# Patient Record
Sex: MALE | Race: WHITE | NOT HISPANIC OR LATINO | Employment: FULL TIME | URBAN - METROPOLITAN AREA
[De-identification: names, ages, dates, MRNs, and addresses within clinical notes are randomized per-mention and may not be internally consistent; named-entity substitution may affect disease eponyms.]

---

## 2018-10-16 ENCOUNTER — OFFICE VISIT (OUTPATIENT)
Dept: ENDOCRINOLOGY | Age: 68
End: 2018-10-16

## 2018-10-16 VITALS
BODY MASS INDEX: 25.06 KG/M2 | HEIGHT: 72 IN | WEIGHT: 185 LBS | SYSTOLIC BLOOD PRESSURE: 142 MMHG | DIASTOLIC BLOOD PRESSURE: 88 MMHG

## 2018-10-16 DIAGNOSIS — B35.1 ONYCHOMYCOSIS: ICD-10-CM

## 2018-10-16 DIAGNOSIS — L84 CORN OF FOOT: ICD-10-CM

## 2018-10-16 DIAGNOSIS — I10 ESSENTIAL HYPERTENSION: ICD-10-CM

## 2018-10-16 DIAGNOSIS — E78.2 MIXED HYPERLIPIDEMIA: ICD-10-CM

## 2018-10-16 DIAGNOSIS — IMO0002 DIABETES MELLITUS TYPE 2, UNCONTROLLED, WITH COMPLICATIONS: Primary | ICD-10-CM

## 2018-10-16 DIAGNOSIS — E67.3 HYPERVITAMINOSIS D: ICD-10-CM

## 2018-10-16 PROCEDURE — 99204 OFFICE O/P NEW MOD 45 MIN: CPT | Performed by: NURSE PRACTITIONER

## 2018-10-16 RX ORDER — EXENATIDE 2 MG/.65ML
2 INJECTION, SUSPENSION, EXTENDED RELEASE SUBCUTANEOUS WEEKLY
Qty: 12 PEN | Refills: 1 | Status: SHIPPED | OUTPATIENT
Start: 2018-10-16 | End: 2019-02-05 | Stop reason: SDUPTHER

## 2018-10-16 RX ORDER — ATORVASTATIN CALCIUM 20 MG/1
20 TABLET, FILM COATED ORAL NIGHTLY
Qty: 90 TABLET | Refills: 1 | Status: SHIPPED | OUTPATIENT
Start: 2018-10-16 | End: 2019-02-05 | Stop reason: SDUPTHER

## 2018-10-16 RX ORDER — DILTIAZEM HYDROCHLORIDE 180 MG/1
180 CAPSULE, COATED, EXTENDED RELEASE ORAL DAILY
COMMUNITY
End: 2020-04-03 | Stop reason: SDUPTHER

## 2018-10-16 RX ORDER — BLOOD-GLUCOSE METER
KIT MISCELLANEOUS
Qty: 300 EACH | Refills: 2 | Status: SHIPPED | OUTPATIENT
Start: 2018-10-16 | End: 2020-04-03 | Stop reason: SDUPTHER

## 2018-10-16 RX ORDER — LOSARTAN POTASSIUM AND HYDROCHLOROTHIAZIDE 12.5; 5 MG/1; MG/1
1 TABLET ORAL DAILY
Qty: 90 TABLET | Refills: 1 | Status: SHIPPED | OUTPATIENT
Start: 2018-10-16 | End: 2019-09-26 | Stop reason: SDUPTHER

## 2018-10-16 RX ORDER — DICLOFENAC SODIUM 75 MG/1
75 TABLET, DELAYED RELEASE ORAL 2 TIMES DAILY
COMMUNITY
Start: 2018-08-15

## 2018-10-16 RX ORDER — EXENATIDE 2 MG/.65ML
INJECTION, SUSPENSION, EXTENDED RELEASE SUBCUTANEOUS
COMMUNITY
Start: 2018-08-03 | End: 2018-10-16 | Stop reason: SDUPTHER

## 2018-10-16 RX ORDER — LOSARTAN POTASSIUM AND HYDROCHLOROTHIAZIDE 12.5; 5 MG/1; MG/1
1 TABLET ORAL DAILY
COMMUNITY
End: 2018-10-16 | Stop reason: SDUPTHER

## 2018-10-16 RX ORDER — ATORVASTATIN CALCIUM 20 MG/1
TABLET, FILM COATED ORAL
COMMUNITY
Start: 2018-08-15 | End: 2018-10-16 | Stop reason: SDUPTHER

## 2018-10-16 RX ORDER — OMEPRAZOLE 20 MG/1
CAPSULE, DELAYED RELEASE ORAL
COMMUNITY
Start: 2018-08-15 | End: 2020-02-13 | Stop reason: SDUPTHER

## 2018-10-16 RX ORDER — LANCETS 28 GAUGE
EACH MISCELLANEOUS
Qty: 300 EACH | Refills: 2 | Status: SHIPPED | OUTPATIENT
Start: 2018-10-16 | End: 2020-04-03 | Stop reason: SDUPTHER

## 2018-10-16 RX ORDER — ICOSAPENT ETHYL 1000 MG/1
2 CAPSULE ORAL 2 TIMES DAILY WITH MEALS
Qty: 360 CAPSULE | Refills: 1 | Status: SHIPPED | OUTPATIENT
Start: 2018-10-16 | End: 2019-01-02 | Stop reason: SDUPTHER

## 2018-10-16 RX ORDER — GABAPENTIN 300 MG/1
CAPSULE ORAL
COMMUNITY
Start: 2018-08-13 | End: 2019-05-21 | Stop reason: SDUPTHER

## 2018-10-16 RX ORDER — TAMSULOSIN HYDROCHLORIDE 0.4 MG/1
CAPSULE ORAL
Refills: 1 | COMMUNITY
Start: 2018-09-28 | End: 2020-05-12

## 2018-10-16 RX ORDER — EMPAGLIFLOZIN 10 MG/1
TABLET, FILM COATED ORAL
COMMUNITY
Start: 2018-08-15 | End: 2018-10-16

## 2018-10-16 RX ORDER — METFORMIN HYDROCHLORIDE 500 MG/1
TABLET, EXTENDED RELEASE ORAL
COMMUNITY
Start: 2018-08-15 | End: 2018-10-16

## 2018-10-16 NOTE — PATIENT INSTRUCTIONS
Stop the jardiance 10mg and metformin    start Synjardy 12.5/1000 mg-1 by mouth twice daily with breakfast or dinner or patient's preference can take 2 capsules together at one time.  With this take vitamin C at least 500 mg and a vitamin D at least at thousand IUs daily increase water at least 4-5 bottles daily especially the first 3-4 weeks while starting this medication    Start the vascepa 1g - 2 cap twice daily      home blood tests -  Blood glucose testing: 3 times daily, that are:  fasting- 1st thing in morning before eating or drinking  before each meal and 1 or 2 hours after meal  bedtime  anytime you feel symptoms of hyperglycemia or hypoglycemia (high or low blood sugars)

## 2018-10-16 NOTE — PROGRESS NOTES
Emmanuel Roach who presents for consult per the request of Provider, No Known  evaluation and treatment of Type 2 diabetes mellitus insulin dependent.      The initial diagnosis of diabetes was made 10 years ago. The condition of diabetes location is system with the course being clinical course has fluctuated , the severity is Moderate , and the modifying/allievating factors are  oral medications. Insulin dosage review with Emmanuel Roach suggested compliance most of the time   .       The patient reports associated symptoms of hyperglycemia have been none and associated symptoms of hypoglycemia have been nausea, with their hypoglycemia threshold for symptoms is 80 mg/dl .       The patient is currently taking home blood tests - Blood glucose testin times daily, that are:  fasting- 1st thing in morning before eating or drinking     Compliance with blood glucose monitoring: good.     Exercise:intermittently The patient is using no plan.    Home blood glucose testing daily: 1  FB to 170 mg/dl    Patterns reported per patient are none.      The following portions of the patient's history were reviewed and updated as appropriate: current medications, past family history, past medical history, past social history, past surgical history and problem list.        Medical records, chart, and labs reviewed from primary care provider.    Past Surgical History:   Procedure Laterality Date   • ELBOW ARTHROPLASTY     • HERNIA REPAIR     • PROSTATE SURGERY         Family History   Problem Relation Age of Onset   • Diabetes Mother          Current Outpatient Prescriptions:   •  atorvastatin (LIPITOR) 20 MG tablet, Take 1 tablet by mouth Every Night., Disp: 90 tablet, Rfl: 1  •  BYDUREON 2 MG pen-injector injection, Inject 1 pen under the skin into the appropriate area as directed 1 (One) Time Per Week., Disp: 12 pen, Rfl: 1  •  diclofenac (VOLTAREN) 75 MG EC tablet, , Disp: , Rfl:   •  diltiaZEM CD (DILTIAZEM CD) 180 MG  "24 hr capsule, Take 180 mg by mouth Daily., Disp: , Rfl:   •  gabapentin (NEURONTIN) 300 MG capsule, , Disp: , Rfl:   •  losartan-hydrochlorothiazide (HYZAAR) 50-12.5 MG per tablet, Take 1 tablet by mouth Daily., Disp: 90 tablet, Rfl: 1  •  omeprazole (priLOSEC) 20 MG capsule, , Disp: , Rfl:   •  tamsulosin (FLOMAX) 0.4 MG capsule 24 hr capsule, TK ONE C PO  BID, Disp: , Rfl: 1  •  Empagliflozin-Metformin HCl 12.5-1000 MG tablet, Take 12.5 mg by mouth 2 (Two) Times a Day., Disp: 180 tablet, Rfl: 1  •  FREESTYLE LITE test strip, Test 3 times daily, Disp: 300 each, Rfl: 2  •  icosapent ethyl (VASCEPA) 1 g capsule capsule, Take 2 g by mouth 2 (Two) Times a Day With Meals., Disp: 360 capsule, Rfl: 1  •  Lancets (FREESTYLE) lancets, Test 3 times daily, Disp: 300 each, Rfl: 2    No Known Allergies    Patient Active Problem List    Diagnosis   • Diabetes mellitus type 2, uncontrolled, with complications (CMS/HCC) [E11.8, E11.65]   • Mixed hyperlipidemia [E78.2]   • Essential hypertension [I10]   • Onychomycosis [B35.1]   • Corn of foot [L84]   • Hypervitaminosis D  [E67.3]       Review of Systems  A comprehensive review of the 14 systems was negative except of listed below:  Genitourinary:positive for frequency and nocturia  Neurological: positive for numbness intermittantly with anthony. LE and UEs, painful lesion on plantar surface of foot described as \"corn\"  Behavioral/Psych: positive for sleep disturbance  Endocrine: hyperglycemia      Objective:     /88   Ht 182.9 cm (72\")   Wt 83.9 kg (185 lb)   BMI 25.09 kg/m²     General appearance:  alert, cooperative,orientated, , fatigued, nourished\" \"appears stated age and cooperative\" \"NAD   Neck: no carotid bruit, supple, symmetrical, trachea midline and thyroid not enlarged, symmetric, no tenderness/mass/nodules   Thyroid:  no palpable nodule, no tenderness, no enlargement,    Lung:  \"clear to auscultation bilaterally\" \"no abnormal breath sounds  \" effort was normal, " "no labored breath, no use of accessory muscles.   Heart: regular rate and rhythm, S1, S2 normal, no murmur, click, rub or gallop      Abdomen:  normal bowel sounds- 4 quads, soft non-tender and contour - slt rounded      Extremities: extremities normal, atraumatic, no cyanosis or edema\" - gait and station, strength and stability\" right plantar surface lesion appr 2cm by 1cm smooth - has been shaved, tender to palpation. Samson feet toenail plates- irregular discolored with yellow gray, ridges and thickened         Skin:    Pulses:  warm and dry, no hyperpigmentation, normal skin coloring, or suspicious lesions  2+ and symmetric   Neuro: Alert and oriented x3. Gait normal. Reflexes and motor strength normal and symmetric. Cranial nerves 2-12 and sensation grossly intact.      Psych: behavior - normal, judgement - normal, mood - normal, affect - normal                   Diabetic foot exam:   Left: Filament test present   Pulses Dorsalis Pedis:  present  Posterior Tibial:  present   Reflexes 3+    Vibratory sensation normal   Proprioception normal   Sharp/dull discrimination normal       Right: Filament test present   Pulses Dorsalis Pedis:  present  Posterior Tibial:  present   Reflexes 3+    Vibratory sensation normal   Proprioception normal   Sharp/dull discrimination normal      Lab Review  PCP labs dated 8.7.2018      Assessment:   Emmanuel was seen today for diabetes.    Diagnoses and all orders for this visit:    Diabetes mellitus type 2, uncontrolled, with complications (CMS/[facility])  -     Fructosamine  -     Comprehensive Metabolic Panel  -     C-Peptide  -     Hemoglobin A1c  -     Insulin, Total  -     Microalbumin / Creatinine Urine Ratio - Urine, Clean Catch  -     Uric Acid  -     Vitamin D 25 Hydroxy  -     TSH  -     Thyroid Antibodies  -     T4, Free  -     T4  -     T3, Free  -     T3  -     BYDUREON 2 MG pen-injector injection; Inject 1 pen under the skin into the appropriate area as directed 1 (One) Time " Per Week.  -     Empagliflozin-Metformin HCl 12.5-1000 MG tablet; Take 12.5 mg by mouth 2 (Two) Times a Day.  -     Ambulatory Referral to Podiatry; Future  -     Comprehensive Metabolic Panel; Future  -     C-Peptide; Future  -     Hemoglobin A1c; Future  -     Insulin, Total; Future  -     Lipid Panel; Future  -     Microalbumin / Creatinine Urine Ratio - Urine, Clean Catch; Future  -     Uric Acid; Future  -     Vitamin D 25 Hydroxy; Future  -     TSH; Future  -     T3; Future  -     T4; Future    Mixed hyperlipidemia  -     Comprehensive Metabolic Panel  -     atorvastatin (LIPITOR) 20 MG tablet; Take 1 tablet by mouth Every Night.  -     icosapent ethyl (VASCEPA) 1 g capsule capsule; Take 2 g by mouth 2 (Two) Times a Day With Meals.  -     Comprehensive Metabolic Panel; Future    Essential hypertension  -     Comprehensive Metabolic Panel  -     losartan-hydrochlorothiazide (HYZAAR) 50-12.5 MG per tablet; Take 1 tablet by mouth Daily.  -     Comprehensive Metabolic Panel; Future    Hypervitaminosis D   -     Vitamin D 25 Hydroxy  -     Comprehensive Metabolic Panel; Future  -     Vitamin D 25 Hydroxy; Future    Dunning of foot  -     Ambulatory Referral to Podiatry; Future  -     Comprehensive Metabolic Panel; Future    Onychomycosis  -     Ambulatory Referral to Podiatry; Future         Plan:    Summary/Medication changes:     Emmanuel Roach who presents for consult per the request of his PCP and self to be  Evaluated  and for the treatment of Type 2 diabetes mellitus insulin dependent.  This is a new problem to me. The initial diagnosis of diabetes was made 10 years ago, as per patient account it was diagnosed with general blood work laboratory testing denied any symptoms at that time..  The modifying/allievating factors are  oral medications.  The medications were reviewed with Emmanuel Roach suggested compliance most of the time   .  The patient reports checking blood glucose one time daily with fasting  averaging between 140 through 170 mg/Sam.  The patient reports associated symptoms of hyperglycemia have been none and associated symptoms of hypoglycemia have been nausea, with their hypoglycemia threshold for symptoms is 80 mg/dl . The  patient's history were reviewed and updated as appropriate: current medications, past family history, past medical history, past social history, past surgical history and problem list.    Medical records, chart, and labs reviewed from primary care provider.  Patient states that his last eye exam was 1.5 years ago with his ophthalmology and there was no difficulty problems at that time.  This provider did instruct him that that is a yearly exam with his diabetes.  Last foot exam was unknown he has not seen a podiatrist.  Was performed today for this provider-no symptoms or signs of neuropathy does have a callus versus plantar wart on right plantar surface of the foot will refer to podiatry.  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  Dental examinations are up-to-date.  Evaluated labs from August 7, 2018 at this time additional lab work will be obtained and treat as indicated with the results.  Today's medication changes were as follows:    1.  Type 2 diabetes uncontrolled    Stop the jardiance 10mg and metformin    start Synjardy 12.5/1000 mg-1 by mouth twice daily with breakfast or dinner or patient's preference can take 2 capsules together at one time.  With this take vitamin C at least 500 mg vitamin D at least at thousand IUs daily increase water at least 4-5 bottles daily especially the first 3-4 weeks while starting this medication    2.  Hyperlipidemia-hypertriglycerides uncontrolled  Start the vascepa 1g - 2 cap twice daily    Additional instructions given:  home blood tests -  Blood glucose  testing: 3 times daily, that are:  fasting- 1st thing in morning before eating or drinking  before each meal and 1 or 2 hours after meal  bedtime  anytime you feel symptoms of hyperglycemia or hypoglycemia (high or low blood sugars)      Education:  interpretation of lab results, blood sugar goals, complications of diabetes mellitus, hypoglycemia prevention and treatment, exercise, illness management, self-monitoring of blood glucose skills, nutrition, carbohydrate counting, site rotation and use of insulin pen        Return in about 3 months (around 1/16/2019), or if symptoms worsen or fail to improve, for Recheck. Follow-up in 3 months with Josephine-2 weeks prior for labs 6 months with Dr. Ojeda-2 weeks prior for labs        Dragon transcription disclaimer     Much of this encounter note is an electronic transcription/translation of spoken language to printed text. The electronic translation of spoken language may permit erroneous, or at times, nonsensical words or phrases to be inadvertently transcribed. Although I have reviewed the note for such errors, some may still exist.

## 2018-10-17 LAB
25(OH)D3+25(OH)D2 SERPL-MCNC: 16.6 NG/ML (ref 30–100)
ALBUMIN SERPL-MCNC: 4.5 G/DL (ref 3.5–5.2)
ALBUMIN/CREAT UR: 9.2 MG/G CREAT (ref 0–30)
ALBUMIN/GLOB SERPL: 1.5 G/DL
ALP SERPL-CCNC: 100 U/L (ref 39–117)
ALT SERPL-CCNC: 32 U/L (ref 1–41)
AST SERPL-CCNC: 20 U/L (ref 1–40)
BILIRUB SERPL-MCNC: 0.4 MG/DL (ref 0.1–1.2)
BUN SERPL-MCNC: 25 MG/DL (ref 8–23)
BUN/CREAT SERPL: 22.1 (ref 7–25)
C PEPTIDE SERPL-MCNC: 3.8 NG/ML (ref 1.1–4.4)
CALCIUM SERPL-MCNC: 9.5 MG/DL (ref 8.6–10.5)
CHLORIDE SERPL-SCNC: 104 MMOL/L (ref 98–107)
CO2 SERPL-SCNC: 23.4 MMOL/L (ref 22–29)
CREAT SERPL-MCNC: 1.13 MG/DL (ref 0.76–1.27)
CREAT UR-MCNC: 89.2 MG/DL
FRUCTOSAMINE SERPL-SCNC: 274 UMOL/L (ref 0–285)
GLOBULIN SER CALC-MCNC: 3 GM/DL
GLUCOSE SERPL-MCNC: 152 MG/DL (ref 65–99)
HBA1C MFR BLD: 6.57 % (ref 4.8–5.6)
INSULIN SERPL-ACNC: 10.4 UIU/ML (ref 2.6–24.9)
MICROALBUMIN UR-MCNC: 8.2 UG/ML
POTASSIUM SERPL-SCNC: 4.1 MMOL/L (ref 3.5–5.2)
PROT SERPL-MCNC: 7.5 G/DL (ref 6–8.5)
SODIUM SERPL-SCNC: 143 MMOL/L (ref 136–145)
T3 SERPL-MCNC: 95 NG/DL (ref 80–200)
T3FREE SERPL-MCNC: 4.9 PG/ML (ref 2–4.4)
T4 FREE SERPL-MCNC: 1.35 NG/DL (ref 0.93–1.7)
T4 SERPL-MCNC: 6.29 MCG/DL (ref 4.5–11.7)
THYROGLOB AB SERPL-ACNC: <1 IU/ML (ref 0–0.9)
THYROPEROXIDASE AB SERPL-ACNC: 9 IU/ML (ref 0–34)
TSH SERPL DL<=0.005 MIU/L-ACNC: 1.32 MIU/ML (ref 0.27–4.2)
URATE SERPL-MCNC: 4.1 MG/DL (ref 3.4–7)

## 2018-10-18 DIAGNOSIS — E55.9 VITAMIN D DEFICIENCY: Primary | ICD-10-CM

## 2018-10-18 RX ORDER — ERGOCALCIFEROL 1.25 MG/1
50000 CAPSULE ORAL DAILY
Qty: 7 CAPSULE | Refills: 0 | Status: SHIPPED | OUTPATIENT
Start: 2018-10-18 | End: 2019-02-05 | Stop reason: SDUPTHER

## 2018-11-19 DIAGNOSIS — IMO0002 DIABETES MELLITUS TYPE 2, UNCONTROLLED, WITH COMPLICATIONS: Primary | ICD-10-CM

## 2018-11-19 RX ORDER — METFORMIN HYDROCHLORIDE 500 MG/1
1000 TABLET, EXTENDED RELEASE ORAL 2 TIMES DAILY
Qty: 120 TABLET | Refills: 11 | Status: SHIPPED | OUTPATIENT
Start: 2018-11-19 | End: 2018-11-21 | Stop reason: SDUPTHER

## 2018-11-20 ENCOUNTER — TELEPHONE (OUTPATIENT)
Dept: ENDOCRINOLOGY | Age: 68
End: 2018-11-20

## 2018-11-20 NOTE — TELEPHONE ENCOUNTER
----- Message from EDVIN Swan sent at 11/19/2018  2:52 PM EST -----  Contact: patient   jardiance and metformin was sent in   ----- Message -----  From: Shanell Villalba MA  Sent: 11/16/2018   3:54 PM  To: EDVIN Swan    Please advise.     ----- Message -----  From: Josephine Hills  Sent: 11/16/2018   3:36 PM  To: Shanell Villalba MA    Medication:  Empagliflozin-Metformin HCl 12.5-1000 MG tablet    Message: patient has called in regards to getting the above medication re called in as individual  prescription. Patient is stating , the [facility] pharmacy dose not carry this medication and will have to get office to re send the medication as individual prescription. patient is requesting the following medication to be sent in   JARDIANCE 10 MG tablet and metFORMIN ER (GLUCOPHAGE-XR) 500 MG 24 hr tablet    Please sent them to the following pharmacy   Pharmacy:  [facility] Phone:  [phone] Fax:  [phone]   Address:  [address] [place] [address]           Patient is requesting a call back in regards to this issue    [phone]

## 2018-11-21 DIAGNOSIS — IMO0002 DIABETES MELLITUS TYPE 2, UNCONTROLLED, WITH COMPLICATIONS: ICD-10-CM

## 2018-11-21 RX ORDER — METFORMIN HYDROCHLORIDE 500 MG/1
1000 TABLET, EXTENDED RELEASE ORAL 2 TIMES DAILY
Qty: 360 TABLET | Refills: 0 | Status: SHIPPED | OUTPATIENT
Start: 2018-11-21 | End: 2018-11-30 | Stop reason: SDUPTHER

## 2018-11-21 RX ORDER — METFORMIN HYDROCHLORIDE 500 MG/1
1000 TABLET, EXTENDED RELEASE ORAL 2 TIMES DAILY
Qty: 360 TABLET | Refills: 0 | Status: SHIPPED | OUTPATIENT
Start: 2018-11-21 | End: 2018-11-21 | Stop reason: SDUPTHER

## 2018-11-21 NOTE — TELEPHONE ENCOUNTER
PATIENT NEEDED MEDICATION SENT TO  [facility]  PHARMACY  IN [facility]

## 2018-11-30 DIAGNOSIS — IMO0002 DIABETES MELLITUS TYPE 2, UNCONTROLLED, WITH COMPLICATIONS: ICD-10-CM

## 2018-11-30 RX ORDER — METFORMIN HYDROCHLORIDE 500 MG/1
1000 TABLET, EXTENDED RELEASE ORAL 2 TIMES DAILY
Qty: 360 TABLET | Refills: 0 | Status: SHIPPED | OUTPATIENT
Start: 2018-11-30 | End: 2019-02-05

## 2019-01-02 DIAGNOSIS — E78.2 MIXED HYPERLIPIDEMIA: ICD-10-CM

## 2019-01-02 RX ORDER — ICOSAPENT ETHYL 1000 MG/1
2 CAPSULE ORAL 2 TIMES DAILY WITH MEALS
Qty: 360 CAPSULE | Refills: 0 | Status: SHIPPED | OUTPATIENT
Start: 2019-01-02 | End: 2019-02-05 | Stop reason: SDUPTHER

## 2019-01-16 ENCOUNTER — RESULTS ENCOUNTER (OUTPATIENT)
Dept: ENDOCRINOLOGY | Age: 69
End: 2019-01-16

## 2019-01-16 DIAGNOSIS — IMO0002 DIABETES MELLITUS TYPE 2, UNCONTROLLED, WITH COMPLICATIONS: ICD-10-CM

## 2019-01-16 DIAGNOSIS — L84 CORN OF FOOT: ICD-10-CM

## 2019-01-16 DIAGNOSIS — E78.2 MIXED HYPERLIPIDEMIA: ICD-10-CM

## 2019-01-16 DIAGNOSIS — I10 ESSENTIAL HYPERTENSION: ICD-10-CM

## 2019-01-16 DIAGNOSIS — E67.3 HYPERVITAMINOSIS D: ICD-10-CM

## 2019-01-23 LAB
25(OH)D3+25(OH)D2 SERPL-MCNC: 27.2 NG/ML (ref 30–100)
ALBUMIN SERPL-MCNC: 4.6 G/DL (ref 3.5–5.2)
ALBUMIN/CREAT UR: <4.8 MG/G CREAT (ref 0–30)
ALBUMIN/GLOB SERPL: 1.6 G/DL
ALP SERPL-CCNC: 78 U/L (ref 39–117)
ALT SERPL-CCNC: 34 U/L (ref 1–41)
AST SERPL-CCNC: 20 U/L (ref 1–40)
BILIRUB SERPL-MCNC: 0.6 MG/DL (ref 0.1–1.2)
BUN SERPL-MCNC: 25 MG/DL (ref 8–23)
BUN/CREAT SERPL: 23.1 (ref 7–25)
C PEPTIDE SERPL-MCNC: 5 NG/ML (ref 1.1–4.4)
CALCIUM SERPL-MCNC: 9.8 MG/DL (ref 8.6–10.5)
CHLORIDE SERPL-SCNC: 102 MMOL/L (ref 98–107)
CHOLEST SERPL-MCNC: 134 MG/DL (ref 0–200)
CO2 SERPL-SCNC: 26 MMOL/L (ref 22–29)
CREAT SERPL-MCNC: 1.08 MG/DL (ref 0.76–1.27)
CREAT UR-MCNC: 62.3 MG/DL
GLOBULIN SER CALC-MCNC: 2.8 GM/DL
GLUCOSE SERPL-MCNC: 143 MG/DL (ref 65–99)
HBA1C MFR BLD: 8.22 % (ref 4.8–5.6)
HDLC SERPL-MCNC: 51 MG/DL (ref 40–60)
INSULIN SERPL-ACNC: 11.3 UIU/ML (ref 2.6–24.9)
INTERPRETATION: NORMAL
LDLC SERPL CALC-MCNC: 69 MG/DL (ref 0–100)
Lab: NORMAL
MICROALBUMIN UR-MCNC: <3 UG/ML
POTASSIUM SERPL-SCNC: 4.8 MMOL/L (ref 3.5–5.2)
PROT SERPL-MCNC: 7.4 G/DL (ref 6–8.5)
SODIUM SERPL-SCNC: 141 MMOL/L (ref 136–145)
T3 SERPL-MCNC: 108.5 NG/DL (ref 80–200)
T4 SERPL-MCNC: 6.74 MCG/DL (ref 4.5–11.7)
TRIGL SERPL-MCNC: 71 MG/DL (ref 0–150)
TSH SERPL DL<=0.005 MIU/L-ACNC: 1.77 MIU/ML (ref 0.27–4.2)
URATE SERPL-MCNC: 4.5 MG/DL (ref 3.4–7)
VLDLC SERPL CALC-MCNC: 14.2 MG/DL (ref 5–40)

## 2019-02-05 ENCOUNTER — OFFICE VISIT (OUTPATIENT)
Dept: ENDOCRINOLOGY | Age: 69
End: 2019-02-05

## 2019-02-05 VITALS
WEIGHT: 192 LBS | HEIGHT: 72 IN | BODY MASS INDEX: 26.01 KG/M2 | DIASTOLIC BLOOD PRESSURE: 86 MMHG | SYSTOLIC BLOOD PRESSURE: 138 MMHG

## 2019-02-05 DIAGNOSIS — E55.9 VITAMIN D DEFICIENCY: ICD-10-CM

## 2019-02-05 DIAGNOSIS — E78.2 MIXED HYPERLIPIDEMIA: ICD-10-CM

## 2019-02-05 DIAGNOSIS — I10 ESSENTIAL HYPERTENSION: ICD-10-CM

## 2019-02-05 DIAGNOSIS — IMO0002 DIABETES MELLITUS TYPE 2, UNCONTROLLED, WITH COMPLICATIONS: Primary | ICD-10-CM

## 2019-02-05 PROCEDURE — 99214 OFFICE O/P EST MOD 30 MIN: CPT | Performed by: NURSE PRACTITIONER

## 2019-02-05 RX ORDER — EXENATIDE 2 MG/.65ML
2 INJECTION, SUSPENSION, EXTENDED RELEASE SUBCUTANEOUS WEEKLY
Qty: 12 PEN | Refills: 1 | Status: SHIPPED | OUTPATIENT
Start: 2019-02-05 | End: 2019-04-11 | Stop reason: SDUPTHER

## 2019-02-05 RX ORDER — CHOLECALCIFEROL (VITAMIN D3) 125 MCG
15 CAPSULE ORAL NIGHTLY
COMMUNITY
End: 2019-02-05

## 2019-02-05 RX ORDER — ATORVASTATIN CALCIUM 20 MG/1
20 TABLET, FILM COATED ORAL NIGHTLY
Qty: 90 TABLET | Refills: 1 | Status: SHIPPED | OUTPATIENT
Start: 2019-02-05 | End: 2019-05-10 | Stop reason: SDUPTHER

## 2019-02-05 RX ORDER — ERGOCALCIFEROL 1.25 MG/1
50000 CAPSULE ORAL DAILY
Qty: 7 CAPSULE | Refills: 0 | Status: SHIPPED | OUTPATIENT
Start: 2019-02-05 | End: 2019-02-07 | Stop reason: SDUPTHER

## 2019-02-05 RX ORDER — ICOSAPENT ETHYL 1000 MG/1
2 CAPSULE ORAL 2 TIMES DAILY WITH MEALS
Qty: 360 CAPSULE | Refills: 1 | Status: SHIPPED | OUTPATIENT
Start: 2019-02-05 | End: 2019-02-08 | Stop reason: SDUPTHER

## 2019-02-05 NOTE — PROGRESS NOTES
Emmanuel Roach  presents to the office  for the follow-up appointment for Type 2 diabetes mellitus.     The diabete's condition location is throughout the system with the  clinical course has fluctuated, the severity is Mild, and the modifying/allievating factors are oral medications.  Medications and  Dosages were  reviewed with Emmanuel Roach and suggested that compliance most of the time.    The patient reports associated symptoms of hyperglycemia have been none and associated symptoms of hypoglycemia have been nausea, with their  hypoglycemia threshold for symptoms is 80 mg/dl .     The patient is currently on oral medications .      Compliance with blood glucose monitoring: fair.     Meal panning: The patient is using avoidance of concentrated sweets.    The patient is currently taking home blood tests - Blood glucose testin times daily, that are:  fasting- 1st thing in morning before eating or drinking    Last instructions given were:  Stop the jardiance 10mg and metformin     start Synjardy 12.1000 mg-1 by mouth twice daily with breakfast or dinner or patient's preference can take 2 capsules together at one time.  With this take vitamin C at least 500 mg and a vitamin D at least at thousand IUs daily increase water at least 4-5 bottles daily especially the first 3-4 weeks while starting this medication     Start the vascepa 1g - 2 cap twice daily    Home blood glucose testing daily: 1  FB to 170 mg/dl  Before evening meal - 120-160 mg/dl       Last reported blood glucose readings are:  Home blood glucose testing daily: 1  FB to 170 mg/dl    Patterns reported per patient are none.         The following portions of the patient's history were reviewed and updated as appropriate: current medications, past family history, past medical history, past social history, past surgical history and problem list.      Current Outpatient Medications:   •  atorvastatin (LIPITOR) 20 MG tablet, Take 1  tablet by mouth Every Night., Disp: 90 tablet, Rfl: 1  •  BYDUREON 2 MG pen-injector injection, Inject 1 pen under the skin into the appropriate area as directed 1 (One) Time Per Week., Disp: 12 pen, Rfl: 1  •  Cholecalciferol (VITAMIN D-3) 5000 units tablet, Take 5,000 Units by mouth Daily. To start after the 50,000 daily for 1 week, Disp: 30 tablet, Rfl: 4  •  diclofenac (VOLTAREN) 75 MG EC tablet, , Disp: , Rfl:   •  diltiaZEM CD (DILTIAZEM CD) 180 MG 24 hr capsule, Take 180 mg by mouth Daily., Disp: , Rfl:   •  FREESTYLE LITE test strip, Test 3 times daily, Disp: 300 each, Rfl: 2  •  gabapentin (NEURONTIN) 300 MG capsule, , Disp: , Rfl:   •  icosapent ethyl (VASCEPA) 1 g capsule capsule, Take 2 g by mouth 2 (Two) Times a Day With Meals., Disp: 360 capsule, Rfl: 1  •  Lancets (FREESTYLE) lancets, Test 3 times daily, Disp: 300 each, Rfl: 2  •  losartan-hydrochlorothiazide (HYZAAR) 50-12.5 MG per tablet, Take 1 tablet by mouth Daily., Disp: 90 tablet, Rfl: 1  •  omeprazole (priLOSEC) 20 MG capsule, , Disp: , Rfl:   •  tamsulosin (FLOMAX) 0.4 MG capsule 24 hr capsule, TK ONE C PO  BID, Disp: , Rfl: 1  •  vitamin D (ERGOCALCIFEROL) 57432 units capsule capsule, Take 1 capsule by mouth Daily., Disp: 7 capsule, Rfl: 0  •  cholecalciferol (VITAMIN D3) 80198 units capsule, Take 1 capsule by mouth Daily., Disp: 90 capsule, Rfl: 1  •  dapagliflozin-metformin HCl ER (XIGDUO XR) 5-1000 MG tablet, Take 1 tablet by mouth 2 (Two) Times a Day., Disp: 60 tablet, Rfl: 2    Patient Active Problem List    Diagnosis   • Vitamin D deficiency [E55.9]   • Diabetes mellitus type 2, uncontrolled, with complications (CMS/HCC) [E11.8, E11.65]   • Mixed hyperlipidemia [E78.2]   • Essential hypertension [I10]   • Onychomycosis [B35.1]   • Corn of foot [L84]   • Hypervitaminosis D  [E67.3]       Review of Systems   A comprehensive review of systems was negative.- 14 systems reviewed.      Objective:     Wt Readings from Last 3 Encounters:  "  02/05/19 87.1 kg (192 lb)   10/16/18 83.9 kg (185 lb)     Temp Readings from Last 3 Encounters:   No data found for Temp     BP Readings from Last 3 Encounters:   02/05/19 138/86   10/16/18 142/88     Pulse Readings from Last 3 Encounters:   No data found for Pulse        /86   Ht 182.9 cm (72.01\")   Wt 87.1 kg (192 lb)   BMI 26.03 kg/m²        Physical Exam   Constitutional: He is oriented to person, place, and time. He appears well-developed and well-nourished. No distress.   HENT:   Head: Normocephalic and atraumatic.   Eyes: EOM are normal. Pupils are equal, round, and reactive to light.   Neck: Normal range of motion. Neck supple. No thyromegaly present.   Cardiovascular: Normal rate, regular rhythm, normal heart sounds and intact distal pulses.   No murmur heard.  Pulmonary/Chest: Effort normal and breath sounds normal.   Abdominal: Soft. Bowel sounds are normal.   Musculoskeletal: Normal range of motion.   Neurological: He is alert and oriented to person, place, and time.   Skin: Skin is warm and dry. Capillary refill takes 2 to 3 seconds. He is not diaphoretic.   Psychiatric: He has a normal mood and affect. His behavior is normal. Judgment and thought content normal.           Lab Review  Results for orders placed or performed in visit on 01/16/19   Comprehensive Metabolic Panel   Result Value Ref Range    Glucose 143 (H) 65 - 99 mg/dL    BUN 25 (H) 8 - 23 mg/dL    Creatinine 1.08 0.76 - 1.27 mg/dL    eGFR Non African Am 68 >60 mL/min/1.73    eGFR African Am 82 >60 mL/min/1.73    BUN/Creatinine Ratio 23.1 7.0 - 25.0    Sodium 141 136 - 145 mmol/L    Potassium 4.8 3.5 - 5.2 mmol/L    Chloride 102 98 - 107 mmol/L    Total CO2 26.0 22.0 - 29.0 mmol/L    Calcium 9.8 8.6 - 10.5 mg/dL    Total Protein 7.4 6.0 - 8.5 g/dL    Albumin 4.60 3.50 - 5.20 g/dL    Globulin 2.8 gm/dL    A/G Ratio 1.6 g/dL    Total Bilirubin 0.6 0.1 - 1.2 mg/dL    Alkaline Phosphatase 78 39 - 117 U/L    AST (SGOT) 20 1 - 40 U/L "    ALT (SGPT) 34 1 - 41 U/L   C-Peptide   Result Value Ref Range    C-Peptide 5.0 (H) 1.1 - 4.4 ng/mL   Hemoglobin A1c   Result Value Ref Range    Hemoglobin A1C 8.22 (H) 4.80 - 5.60 %   Insulin, Total   Result Value Ref Range    Insulin 11.3 2.6 - 24.9 uIU/mL   Lipid Panel   Result Value Ref Range    Total Cholesterol 134 0 - 200 mg/dL    Triglycerides 71 0 - 150 mg/dL    HDL Cholesterol 51 40 - 60 mg/dL    VLDL Cholesterol 14.2 5 - 40 mg/dL    LDL Cholesterol  69 0 - 100 mg/dL   Microalbumin / Creatinine Urine Ratio - Urine, Clean Catch   Result Value Ref Range    Creatinine, Urine 62.3 Not Estab. mg/dL    Microalbumin, Urine <3.0 Not Estab. ug/mL    Microalbumin/Creatinine Ratio <4.8 0.0 - 30.0 mg/g creat   Uric Acid   Result Value Ref Range    Uric Acid 4.5 3.4 - 7.0 mg/dL   Vitamin D 25 Hydroxy   Result Value Ref Range    25 Hydroxy, Vitamin D 27.2 (L) 30.0 - 100.0 ng/ml   TSH   Result Value Ref Range    TSH 1.770 0.270 - 4.200 mIU/mL   T3   Result Value Ref Range    T3, Total 108.5 80.0 - 200.0 ng/dl   T4   Result Value Ref Range    T4, Total 6.74 4.50 - 11.70 mcg/dL   Cardiovascular Risk Assessment   Result Value Ref Range    Interpretation Note    Diabetes Patient Education   Result Value Ref Range    PDF Image Not applicable            Assessment:   Emmanuel was seen today for follow-up.    Diagnoses and all orders for this visit:    Diabetes mellitus type 2, uncontrolled, with complications (CMS/[facility])  -     dapagliflozin-metformin HCl ER (XIGDUO XR) 5-1000 MG tablet; Take 1 tablet by mouth 2 (Two) Times a Day.  -     BYDUREON 2 MG pen-injector injection; Inject 1 pen under the skin into the appropriate area as directed 1 (One) Time Per Week.  -     Comprehensive Metabolic Panel; Future  -     C-Peptide; Future  -     Hemoglobin A1c; Future  -     Lipid Panel; Future  -     Microalbumin / Creatinine Urine Ratio - Urine, Clean Catch; Future  -     Uric Acid; Future  -     Vitamin D 25 Hydroxy; Future  -      TSH; Future  -     T4, Free; Future  -     Thyroid Antibodies; Future    Mixed hyperlipidemia  -     atorvastatin (LIPITOR) 20 MG tablet; Take 1 tablet by mouth Every Night.  -     icosapent ethyl (VASCEPA) 1 g capsule capsule; Take 2 g by mouth 2 (Two) Times a Day With Meals.  -     Comprehensive Metabolic Panel; Future  -     Lipid Panel; Future    Essential hypertension  -     Comprehensive Metabolic Panel; Future    Vitamin D deficiency  -     vitamin D (ERGOCALCIFEROL) 81863 units capsule capsule; Take 1 capsule by mouth Daily.  -     cholecalciferol (VITAMIN D3) 59038 units capsule; Take 1 capsule by mouth Daily.  -     Comprehensive Metabolic Panel; Future  -     Vitamin D 25 Hydroxy; Future          Plan:   In summary/ Medication changes: I met with this patient is metabolically stable and doing well at this time.  Laboratory testing was reviewed dated 1.16.2019, discussed with questions and answers completed.  Discussed and formulate a treatment plan with patient, patient verbally stated understood all instructions.    1. Diagnoses and all orders for this visit:    Diabetes mellitus type 2, uncontrolled, with complications  chronic, stable no medication changes at this time. Refills prescribed. Future labs ordered for upcoming appointment and assessment.     Stop the Jardiance and metformin    Start Xigduo XR 5/1000 twice daily with breakfast and dinner    Continue Bydureon      home blood tests -  Blood glucose testing: 3 times daily, that are:  fasting- 1st thing in morning before eating or drinking  before each meal and 1 or 2 hours after meal  Bedtime aand anytime you feel symptoms of hyperglycemia or hypoglycemia (high or low blood sugars)      Mixed hyperlipidemia - chronic, stable no medication changes at this time. Refills prescribed. Future labs ordered for upcoming appointment and assessment.      Essential hypertension- chronic, stable no medication changes at this time. Refills prescribed.  Future labs ordered for upcoming appointment and assessment.        Vitamin D deficiency- chronic, uncontrolled.  Medication changes were as follows    Vitamin D 50,000 units 1 by mouth for 7 days only then maintenance dose will be 10,000 units daily.           Education:  interpretation of lab results, blood sugar goals, complications of diabetes mellitus, hypoglycemia prevention and treatment, exercise, illness management, self-monitoring of blood glucose skills, nutrition and carbohydrate counting        The total face to face time spent was  25 minutes  with additional education given: 14 minutes (greater than 50% of the total time) was spent with counseling and coordination of care on: SMBG with goals, Side effects profiles with medications, medication use and purposes,     Return in about 3 months (around 5/5/2019), or if symptoms worsen or fail to improve, for Recheck. 3 months with Josephine-2 weeks prior for labs 6 months with Dr. Ojeda-2 weeks prior for labs      Dragon transcription disclaimer     Much of this encounter note is an electronic transcription/translation of spoken language to printed text. The electronic translation of spoken language may permit erroneous, or at times, nonsensical words or phrases to be inadvertently transcribed. Although I have reviewed the note for such errors, some may still exist.

## 2019-02-05 NOTE — PATIENT INSTRUCTIONS
Stop the Jardiance and metformin    Start Xigduo XR 5/1000 twice daily with breakfast and dinner    Continue Bydureon    Vitamin D 50,000 units 1 by mouth for 7 days only then maintenance dose will be 10,000 units daily.    home blood tests -  Blood glucose testing: 3 times daily, that are:  fasting- 1st thing in morning before eating or drinking  before each meal and 1 or 2 hours after meal  bedtime  anytime you feel symptoms of hyperglycemia or hypoglycemia (high or low blood sugars)

## 2019-02-07 DIAGNOSIS — E55.9 VITAMIN D DEFICIENCY: ICD-10-CM

## 2019-02-07 DIAGNOSIS — IMO0002 DIABETES MELLITUS TYPE 2, UNCONTROLLED, WITH COMPLICATIONS: ICD-10-CM

## 2019-02-07 RX ORDER — ERGOCALCIFEROL 1.25 MG/1
50000 CAPSULE ORAL DAILY
Qty: 7 CAPSULE | Refills: 0 | Status: SHIPPED | OUTPATIENT
Start: 2019-02-07 | End: 2019-02-08 | Stop reason: SDUPTHER

## 2019-02-08 DIAGNOSIS — E55.9 VITAMIN D DEFICIENCY: ICD-10-CM

## 2019-02-08 DIAGNOSIS — E78.2 MIXED HYPERLIPIDEMIA: ICD-10-CM

## 2019-02-08 RX ORDER — ERGOCALCIFEROL 1.25 MG/1
50000 CAPSULE ORAL DAILY
Qty: 7 CAPSULE | Refills: 0 | Status: SHIPPED | OUTPATIENT
Start: 2019-02-08 | End: 2019-05-21

## 2019-02-08 RX ORDER — ICOSAPENT ETHYL 1000 MG/1
2 CAPSULE ORAL 2 TIMES DAILY WITH MEALS
Qty: 360 CAPSULE | Refills: 1 | Status: SHIPPED | OUTPATIENT
Start: 2019-02-08 | End: 2019-05-21 | Stop reason: SDUPTHER

## 2019-04-11 DIAGNOSIS — IMO0002 DIABETES MELLITUS TYPE 2, UNCONTROLLED, WITH COMPLICATIONS: ICD-10-CM

## 2019-04-11 RX ORDER — EXENATIDE 2 MG/.65ML
2 INJECTION, SUSPENSION, EXTENDED RELEASE SUBCUTANEOUS WEEKLY
Qty: 12 PEN | Refills: 0 | Status: SHIPPED | OUTPATIENT
Start: 2019-04-11 | End: 2019-04-17 | Stop reason: SDUPTHER

## 2019-04-17 DIAGNOSIS — IMO0002 DIABETES MELLITUS TYPE 2, UNCONTROLLED, WITH COMPLICATIONS: ICD-10-CM

## 2019-04-17 RX ORDER — EXENATIDE 2 MG/.65ML
2 INJECTION, SUSPENSION, EXTENDED RELEASE SUBCUTANEOUS WEEKLY
Qty: 12 PEN | Refills: 0 | Status: SHIPPED | OUTPATIENT
Start: 2019-04-17 | End: 2019-05-21 | Stop reason: SDUPTHER

## 2019-05-05 ENCOUNTER — RESULTS ENCOUNTER (OUTPATIENT)
Dept: ENDOCRINOLOGY | Age: 69
End: 2019-05-05

## 2019-05-05 DIAGNOSIS — E55.9 VITAMIN D DEFICIENCY: ICD-10-CM

## 2019-05-05 DIAGNOSIS — E78.2 MIXED HYPERLIPIDEMIA: ICD-10-CM

## 2019-05-05 DIAGNOSIS — IMO0002 DIABETES MELLITUS TYPE 2, UNCONTROLLED, WITH COMPLICATIONS: ICD-10-CM

## 2019-05-05 DIAGNOSIS — I10 ESSENTIAL HYPERTENSION: ICD-10-CM

## 2019-05-07 ENCOUNTER — LAB (OUTPATIENT)
Dept: ENDOCRINOLOGY | Age: 69
End: 2019-05-07

## 2019-05-07 DIAGNOSIS — E78.2 MIXED HYPERLIPIDEMIA: Primary | ICD-10-CM

## 2019-05-07 DIAGNOSIS — E78.2 MIXED HYPERLIPIDEMIA: ICD-10-CM

## 2019-05-07 DIAGNOSIS — E67.3 HYPERVITAMINOSIS D: ICD-10-CM

## 2019-05-07 DIAGNOSIS — E55.9 VITAMIN D DEFICIENCY: ICD-10-CM

## 2019-05-07 DIAGNOSIS — IMO0002 DIABETES MELLITUS TYPE 2, UNCONTROLLED, WITH COMPLICATIONS: ICD-10-CM

## 2019-05-08 LAB
25(OH)D3+25(OH)D2 SERPL-MCNC: 64.8 NG/ML (ref 30–100)
ALBUMIN SERPL-MCNC: 4.7 G/DL (ref 3.5–5.2)
ALBUMIN/GLOB SERPL: 1.9 G/DL
ALP SERPL-CCNC: 74 U/L (ref 39–117)
ALT SERPL-CCNC: 35 U/L (ref 1–41)
AST SERPL-CCNC: 19 U/L (ref 1–40)
BILIRUB SERPL-MCNC: 0.6 MG/DL (ref 0.2–1.2)
BUN SERPL-MCNC: 31 MG/DL (ref 8–23)
BUN/CREAT SERPL: 25 (ref 7–25)
C PEPTIDE SERPL-MCNC: 4.8 NG/ML (ref 1.1–4.4)
CALCIUM SERPL-MCNC: 10 MG/DL (ref 8.6–10.5)
CHLORIDE SERPL-SCNC: 100 MMOL/L (ref 98–107)
CHOLEST SERPL-MCNC: 117 MG/DL (ref 0–200)
CO2 SERPL-SCNC: 25.3 MMOL/L (ref 22–29)
CREAT SERPL-MCNC: 1.24 MG/DL (ref 0.76–1.27)
GLOBULIN SER CALC-MCNC: 2.5 GM/DL
GLUCOSE SERPL-MCNC: 145 MG/DL (ref 65–99)
HBA1C MFR BLD: 7.9 % (ref 4.8–5.6)
HDLC SERPL-MCNC: 40 MG/DL (ref 40–60)
INTERPRETATION: NORMAL
LDLC SERPL CALC-MCNC: 51 MG/DL (ref 0–100)
Lab: NORMAL
MICROALBUMIN UR-MCNC: 15 UG/ML
POTASSIUM SERPL-SCNC: 4.1 MMOL/L (ref 3.5–5.2)
PROT SERPL-MCNC: 7.2 G/DL (ref 6–8.5)
SODIUM SERPL-SCNC: 139 MMOL/L (ref 136–145)
T3FREE SERPL-MCNC: 3.9 PG/ML (ref 2–4.4)
T4 FREE SERPL-MCNC: 1.36 NG/DL (ref 0.93–1.7)
T4 SERPL-MCNC: 5.78 MCG/DL (ref 4.5–11.7)
TRIGL SERPL-MCNC: 132 MG/DL (ref 0–150)
TSH SERPL DL<=0.005 MIU/L-ACNC: 1.76 MIU/ML (ref 0.27–4.2)
URATE SERPL-MCNC: 4.6 MG/DL (ref 3.4–7)
VLDLC SERPL CALC-MCNC: 26.4 MG/DL

## 2019-05-10 DIAGNOSIS — E78.2 MIXED HYPERLIPIDEMIA: ICD-10-CM

## 2019-05-10 DIAGNOSIS — IMO0002 DIABETES MELLITUS TYPE 2, UNCONTROLLED, WITH COMPLICATIONS: ICD-10-CM

## 2019-05-10 RX ORDER — ATORVASTATIN CALCIUM 20 MG/1
20 TABLET, FILM COATED ORAL NIGHTLY
Qty: 90 TABLET | Refills: 1 | Status: SHIPPED | OUTPATIENT
Start: 2019-05-10 | End: 2019-09-26 | Stop reason: SDUPTHER

## 2019-05-21 ENCOUNTER — OFFICE VISIT (OUTPATIENT)
Dept: ENDOCRINOLOGY | Age: 69
End: 2019-05-21

## 2019-05-21 VITALS
BODY MASS INDEX: 25.87 KG/M2 | SYSTOLIC BLOOD PRESSURE: 124 MMHG | WEIGHT: 191 LBS | DIASTOLIC BLOOD PRESSURE: 74 MMHG | HEIGHT: 72 IN

## 2019-05-21 DIAGNOSIS — I10 ESSENTIAL HYPERTENSION: ICD-10-CM

## 2019-05-21 DIAGNOSIS — IMO0002 DIABETES MELLITUS TYPE 2, UNCONTROLLED, WITH COMPLICATIONS: Primary | ICD-10-CM

## 2019-05-21 DIAGNOSIS — E55.9 VITAMIN D DEFICIENCY: ICD-10-CM

## 2019-05-21 DIAGNOSIS — E78.2 MIXED HYPERLIPIDEMIA: ICD-10-CM

## 2019-05-21 PROCEDURE — 99214 OFFICE O/P EST MOD 30 MIN: CPT | Performed by: INTERNAL MEDICINE

## 2019-05-21 RX ORDER — GABAPENTIN 300 MG/1
600 CAPSULE ORAL 2 TIMES DAILY
Qty: 180 CAPSULE | Refills: 1 | Status: SHIPPED | OUTPATIENT
Start: 2019-05-21 | End: 2019-09-26 | Stop reason: SDUPTHER

## 2019-05-21 RX ORDER — DAPAGLIFLOZIN AND METFORMIN HYDROCHLORIDE 5; 1000 MG/1; MG/1
1 TABLET, FILM COATED, EXTENDED RELEASE ORAL 2 TIMES DAILY
Qty: 180 TABLET | Refills: 3 | Status: SHIPPED | OUTPATIENT
Start: 2019-05-21 | End: 2019-09-26 | Stop reason: SDUPTHER

## 2019-05-21 RX ORDER — ICOSAPENT ETHYL 1000 MG/1
2 CAPSULE ORAL 2 TIMES DAILY WITH MEALS
Qty: 360 CAPSULE | Refills: 3 | Status: SHIPPED | OUTPATIENT
Start: 2019-05-21 | End: 2019-09-26 | Stop reason: SDUPTHER

## 2019-05-21 RX ORDER — EXENATIDE 2 MG/.65ML
2 INJECTION, SUSPENSION, EXTENDED RELEASE SUBCUTANEOUS WEEKLY
Qty: 12 PEN | Refills: 3 | Status: SHIPPED | OUTPATIENT
Start: 2019-05-21 | End: 2019-05-21

## 2019-05-21 RX ORDER — DULAGLUTIDE 1.5 MG/.5ML
1.5 INJECTION, SOLUTION SUBCUTANEOUS WEEKLY
Qty: 12 PEN | Refills: 3 | Status: SHIPPED | OUTPATIENT
Start: 2019-05-21 | End: 2019-09-26 | Stop reason: SDUPTHER

## 2019-05-21 RX ORDER — PIOGLITAZONEHYDROCHLORIDE 30 MG/1
30 TABLET ORAL DAILY
Qty: 90 TABLET | Refills: 3 | Status: SHIPPED | OUTPATIENT
Start: 2019-05-21 | End: 2019-09-26 | Stop reason: SDUPTHER

## 2019-05-21 NOTE — PROGRESS NOTES
"Subjective   Emmanuel Roach is a 69 y.o. male is here for a follow up for type 2 DM, HTN, hyperlipidemia and vit d deficiency. LAb review. Pt is testing BG one time daily, pt did not bring meter.Pt is not taking tamsulosin. Pt denies any issues or concerns. /74   Ht 182.9 cm (72\")   Wt 86.6 kg (191 lb)   BMI 25.90 kg/m²   No Known Allergies    Current Outpatient Medications:   •  atorvastatin (LIPITOR) 20 MG tablet, Take 1 tablet by mouth Every Night., Disp: 90 tablet, Rfl: 1  •  BYDUREON 2 MG pen-injector injection, Inject 1 pen under the skin into the appropriate area as directed 1 (One) Time Per Week., Disp: 12 pen, Rfl: 0  •  Cholecalciferol (VITAMIN D-3) 5000 units tablet, Take 5,000 Units by mouth Daily. To start after the 50,000 daily for 1 week, Disp: 30 tablet, Rfl: 4  •  dapagliflozin-metformin HCl ER (XIGDUO XR) 5-1000 MG tablet, Take 1 tablet by mouth 2 (Two) Times a Day., Disp: 180 tablet, Rfl: 0  •  diclofenac (VOLTAREN) 75 MG EC tablet, , Disp: , Rfl:   •  diltiaZEM CD (DILTIAZEM CD) 180 MG 24 hr capsule, Take 180 mg by mouth Daily., Disp: , Rfl:   •  FREESTYLE LITE test strip, Test 3 times daily, Disp: 300 each, Rfl: 2  •  gabapentin (NEURONTIN) 300 MG capsule, , Disp: , Rfl:   •  icosapent ethyl (VASCEPA) 1 g capsule capsule, Take 2 g by mouth 2 (Two) Times a Day With Meals., Disp: 360 capsule, Rfl: 1  •  Lancets (FREESTYLE) lancets, Test 3 times daily, Disp: 300 each, Rfl: 2  •  losartan-hydrochlorothiazide (HYZAAR) 50-12.5 MG per tablet, Take 1 tablet by mouth Daily., Disp: 90 tablet, Rfl: 1  •  omeprazole (priLOSEC) 20 MG capsule, , Disp: , Rfl:   •  tamsulosin (FLOMAX) 0.4 MG capsule 24 hr capsule, TK ONE C PO  BID, Disp: , Rfl: 1      History of Present Illness this is a 69-year-old gentleman known patient with type 2 diabetes hypertension and dyslipidemia as well as vitamin D deficiency.  Over the course of last 6 months he has had no significant health problem for which to go to " the ER or hospital.    The following portions of the patient's history were reviewed and updated as appropriate: allergies, current medications, past family history, past medical history, past social history, past surgical history and problem list.    Review of Systems   Constitutional: Negative for fatigue.   HENT: Negative for trouble swallowing.    Eyes: Negative for visual disturbance.   Cardiovascular: Negative for leg swelling.   Endocrine: Negative for polyuria.   Skin: Negative for wound.   Neurological: Negative for numbness.       Objective   Physical Exam   Constitutional: He is oriented to person, place, and time. He appears well-developed and well-nourished. No distress.   HENT:   Head: Normocephalic and atraumatic.   Right Ear: External ear normal.   Left Ear: External ear normal.   Nose: Nose normal.   Mouth/Throat: Oropharynx is clear and moist. No oropharyngeal exudate.   Eyes: Conjunctivae and EOM are normal. Pupils are equal, round, and reactive to light. Right eye exhibits no discharge. Left eye exhibits no discharge. No scleral icterus.   Neck: Normal range of motion. Neck supple. No JVD present. No tracheal deviation present. No thyromegaly present.   Cardiovascular: Normal rate, regular rhythm, normal heart sounds and intact distal pulses. Exam reveals no gallop and no friction rub.   No murmur heard.  Pulmonary/Chest: Effort normal and breath sounds normal. No stridor. No respiratory distress. He has no wheezes. He has no rales. He exhibits no tenderness.   Abdominal: Soft. Bowel sounds are normal. He exhibits no distension and no mass. There is no tenderness. There is no rebound and no guarding. No hernia.   Musculoskeletal: Normal range of motion. He exhibits no edema, tenderness or deformity.   Lymphadenopathy:     He has no cervical adenopathy.   Neurological: He is alert and oriented to person, place, and time. He displays normal reflexes. No cranial nerve deficit or sensory deficit. He  exhibits normal muscle tone. Coordination normal.   Skin: Skin is warm and dry. No rash noted. He is not diaphoretic. No erythema. No pallor.   Psychiatric: He has a normal mood and affect. His behavior is normal. Judgment and thought content normal.   Nursing note and vitals reviewed.        Assessment/Plan   Diagnoses and all orders for this visit:    Diabetes mellitus type 2, uncontrolled, with complications (CMS/HCC)  -     XIGDUO XR 5-1000 MG tablet; Take 1 tablet by mouth 2 (Two) Times a Day.  -     Discontinue: BYDUREON 2 MG pen-injector injection; Inject 1 pen under the skin into the appropriate area as directed 1 (One) Time Per Week.  -     T4 & TSH ([facility]); Future  -     Uric Acid; Future  -     Vitamin D 25 Hydroxy; Future  -     Comprehensive Metabolic Panel; Future  -     C-Peptide; Future  -     Follicle Stimulating Hormone; Future  -     Lipid Panel; Future  -     MicroAlbumin, Urine, Random - Urine, Clean Catch; Future    Mixed hyperlipidemia  -     VASCEPA 1 g capsule capsule; Take 2 g by mouth 2 (Two) Times a Day With Meals.  -     T4 & TSH ([facility]); Future  -     Uric Acid; Future  -     Vitamin D 25 Hydroxy; Future  -     Comprehensive Metabolic Panel; Future  -     C-Peptide; Future  -     Follicle Stimulating Hormone; Future  -     Lipid Panel; Future  -     MicroAlbumin, Urine, Random - Urine, Clean Catch; Future    Essential hypertension  -     T4 & TSH ([facility]); Future  -     Uric Acid; Future  -     Vitamin D 25 Hydroxy; Future  -     Comprehensive Metabolic Panel; Future  -     C-Peptide; Future  -     Follicle Stimulating Hormone; Future  -     Lipid Panel; Future  -     MicroAlbumin, Urine, Random - Urine, Clean Catch; Future    Vitamin D deficiency  -     T4 & TSH ([facility]); Future  -     Uric Acid; Future  -     Vitamin D 25 Hydroxy; Future  -     Comprehensive Metabolic Panel; Future  -     C-Peptide; Future  -     Follicle Stimulating Hormone; Future  -     Lipid Panel; Future  -      MicroAlbumin, Urine, Random - Urine, Clean Catch; Future    Other orders  -     pioglitazone (ACTOS) 30 MG tablet; Take 1 tablet by mouth Daily.  -     gabapentin (NEURONTIN) 300 MG capsule; Take 2 capsules by mouth 2 (Two) Times a Day.  -     TRULICITY 1.5 MG/0.5ML solution pen-injector; Inject 1.5 mg under the skin into the appropriate area as directed 1 (One) Time Per Week.        In summary I saw and examined this 69-year-old gentleman for above-mentioned problems.  I reviewed his laboratory evaluation of May 7, 2019 and provided him with a hard copy of it.  With exception of a hemoglobin A1c of 7.9 he is clinically and metabolically stable and therefore we will go ahead and continue all his current prescriptions.  Before he left he asked to be switched from by [facility] to [facility] because of his insurance company has better access to [facility].  He will see Ms. Josephine Topeteseymour in 4 months or sooner if needed with laboratory evaluation prior to each office visit.

## 2019-08-16 DIAGNOSIS — E55.9 VITAMIN D DEFICIENCY: ICD-10-CM

## 2019-09-06 ENCOUNTER — RESULTS ENCOUNTER (OUTPATIENT)
Dept: ENDOCRINOLOGY | Age: 69
End: 2019-09-06

## 2019-09-06 DIAGNOSIS — E78.2 MIXED HYPERLIPIDEMIA: ICD-10-CM

## 2019-09-06 DIAGNOSIS — IMO0002 DIABETES MELLITUS TYPE 2, UNCONTROLLED, WITH COMPLICATIONS: ICD-10-CM

## 2019-09-06 DIAGNOSIS — I10 ESSENTIAL HYPERTENSION: ICD-10-CM

## 2019-09-06 DIAGNOSIS — E55.9 VITAMIN D DEFICIENCY: ICD-10-CM

## 2019-09-13 LAB
25(OH)D3+25(OH)D2 SERPL-MCNC: 55 NG/ML (ref 30–100)
ALBUMIN SERPL-MCNC: 4.6 G/DL (ref 3.5–5.2)
ALBUMIN/GLOB SERPL: 1.7 G/DL
ALP SERPL-CCNC: 80 U/L (ref 39–117)
ALT SERPL-CCNC: 33 U/L (ref 1–41)
AST SERPL-CCNC: 19 U/L (ref 1–40)
BILIRUB SERPL-MCNC: 0.5 MG/DL (ref 0.2–1.2)
BUN SERPL-MCNC: 33 MG/DL (ref 8–23)
BUN/CREAT SERPL: 22.4 (ref 7–25)
C PEPTIDE SERPL-MCNC: 4.8 NG/ML (ref 1.1–4.4)
CALCIUM SERPL-MCNC: 10 MG/DL (ref 8.6–10.5)
CHLORIDE SERPL-SCNC: 101 MMOL/L (ref 98–107)
CHOLEST SERPL-MCNC: 129 MG/DL (ref 0–200)
CO2 SERPL-SCNC: 27.1 MMOL/L (ref 22–29)
CREAT SERPL-MCNC: 1.47 MG/DL (ref 0.76–1.27)
FSH SERPL-ACNC: 5.4 MIU/ML (ref 1.5–12.4)
GLOBULIN SER CALC-MCNC: 2.7 GM/DL
GLUCOSE SERPL-MCNC: 146 MG/DL (ref 65–99)
HBA1C MFR BLD: 7.7 % (ref 4.8–5.6)
HDLC SERPL-MCNC: 42 MG/DL (ref 40–60)
INTERPRETATION: NORMAL
LDLC SERPL CALC-MCNC: 70 MG/DL (ref 0–100)
Lab: NORMAL
MICROALBUMIN UR-MCNC: 8 UG/ML
POTASSIUM SERPL-SCNC: 4.5 MMOL/L (ref 3.5–5.2)
PROT SERPL-MCNC: 7.3 G/DL (ref 6–8.5)
SODIUM SERPL-SCNC: 143 MMOL/L (ref 136–145)
T4 SERPL-MCNC: 6.37 MCG/DL (ref 4.5–11.7)
TRIGL SERPL-MCNC: 87 MG/DL (ref 0–150)
TSH SERPL DL<=0.005 MIU/L-ACNC: 1.59 UIU/ML (ref 0.27–4.2)
URATE SERPL-MCNC: 4.9 MG/DL (ref 3.4–7)
VLDLC SERPL CALC-MCNC: 17.4 MG/DL

## 2019-09-26 ENCOUNTER — OFFICE VISIT (OUTPATIENT)
Dept: ENDOCRINOLOGY | Age: 69
End: 2019-09-26

## 2019-09-26 VITALS
DIASTOLIC BLOOD PRESSURE: 76 MMHG | WEIGHT: 192 LBS | SYSTOLIC BLOOD PRESSURE: 124 MMHG | HEIGHT: 72 IN | BODY MASS INDEX: 26.01 KG/M2

## 2019-09-26 DIAGNOSIS — I10 ESSENTIAL HYPERTENSION: ICD-10-CM

## 2019-09-26 DIAGNOSIS — E55.9 VITAMIN D DEFICIENCY: ICD-10-CM

## 2019-09-26 DIAGNOSIS — E78.2 MIXED HYPERLIPIDEMIA: ICD-10-CM

## 2019-09-26 DIAGNOSIS — IMO0002 DIABETES MELLITUS TYPE 2, UNCONTROLLED, WITH COMPLICATIONS: Primary | ICD-10-CM

## 2019-09-26 DIAGNOSIS — E11.43 DIABETIC AUTONOMIC NEUROPATHY ASSOCIATED WITH TYPE 2 DIABETES MELLITUS (HCC): ICD-10-CM

## 2019-09-26 PROCEDURE — 99214 OFFICE O/P EST MOD 30 MIN: CPT | Performed by: NURSE PRACTITIONER

## 2019-09-26 RX ORDER — ATORVASTATIN CALCIUM 20 MG/1
20 TABLET, FILM COATED ORAL NIGHTLY
Qty: 90 TABLET | Refills: 1 | Status: SHIPPED | OUTPATIENT
Start: 2019-09-26 | End: 2019-11-07 | Stop reason: SDUPTHER

## 2019-09-26 RX ORDER — GABAPENTIN 300 MG/1
600 CAPSULE ORAL DAILY
Qty: 90 CAPSULE | Refills: 1 | Status: SHIPPED | OUTPATIENT
Start: 2019-09-26 | End: 2020-05-12

## 2019-09-26 RX ORDER — ICOSAPENT ETHYL 1000 MG/1
2 CAPSULE ORAL 2 TIMES DAILY WITH MEALS
Qty: 360 CAPSULE | Refills: 1 | Status: SHIPPED | OUTPATIENT
Start: 2019-09-26 | End: 2020-04-03 | Stop reason: SDUPTHER

## 2019-09-26 RX ORDER — DULAGLUTIDE 1.5 MG/.5ML
1.5 INJECTION, SOLUTION SUBCUTANEOUS WEEKLY
Qty: 12 PEN | Refills: 3 | Status: SHIPPED | OUTPATIENT
Start: 2019-09-26 | End: 2020-04-03 | Stop reason: SDUPTHER

## 2019-09-26 RX ORDER — PIOGLITAZONEHYDROCHLORIDE 30 MG/1
30 TABLET ORAL DAILY
Qty: 90 TABLET | Refills: 1 | Status: SHIPPED | OUTPATIENT
Start: 2019-09-26 | End: 2020-02-13 | Stop reason: SDUPTHER

## 2019-09-26 RX ORDER — LOSARTAN POTASSIUM AND HYDROCHLOROTHIAZIDE 12.5; 5 MG/1; MG/1
1 TABLET ORAL DAILY
Qty: 90 TABLET | Refills: 1 | Status: SHIPPED | OUTPATIENT
Start: 2019-09-26

## 2019-09-26 RX ORDER — DAPAGLIFLOZIN AND METFORMIN HYDROCHLORIDE 5; 1000 MG/1; MG/1
1 TABLET, FILM COATED, EXTENDED RELEASE ORAL 2 TIMES DAILY
Qty: 180 TABLET | Refills: 1 | Status: SHIPPED | OUTPATIENT
Start: 2019-09-26 | End: 2020-04-03 | Stop reason: SDUPTHER

## 2019-09-26 RX ORDER — NATEGLINIDE 60 MG/1
60 TABLET ORAL
Qty: 270 TABLET | Refills: 1 | Status: SHIPPED | OUTPATIENT
Start: 2019-09-26 | End: 2020-04-03

## 2019-09-26 NOTE — PROGRESS NOTES
Emmanuel Roach  presents to the office  for the follow-up appointment for Type 2 diabetes mellitus.     The diabete's condition location is throughout the system with the  clinical course has fluctuated, the severity is Mild, and the modifying/allievating factors are oral medications.  Medications and  Dosages were  reviewed with Emmanuel Roach and suggested that compliance most of the time.    The patient reports associated symptoms of hyperglycemia have been none and associated symptoms of hypoglycemia have been nausea, with their  hypoglycemia threshold for symptoms is 80 mg/dl .     The patient is currently on oral medications .      Compliance with blood glucose monitoring: good.     Meal panning: The patient is using avoidance of concentrated sweets.    The patient is currently taking home blood tests - Blood glucose testin times daily, that are:  fasting- 1st thing in morning before eating or drinking    Last instructions given were:  In summary I saw and examined this 69-year-old gentleman for above-mentioned problems.  I reviewed his laboratory evaluation of May 7, 2019 and provided him with a hard copy of it.  With exception of a hemoglobin A1c of 7.9 he is clinically and metabolically stable and therefore we will go ahead and continue all his current prescriptions.  Before he left he asked to be switched from by [facility] to [employer] because of his insurance company has better access to [employer].  He will see Ms. Josephine Key in 4 months or sooner if needed with laboratory evaluation prior to each office visit.    Home blood glucose testing daily: 1  FB to 160 mg/dl-     Last reported blood glucose readings are:   None.    Patterns reported per patient are none.         The following portions of the patient's history were reviewed and updated as appropriate: current medications, past family history, past medical history, past social history, past surgical history and problem  list.      Current Outpatient Medications:   •  atorvastatin (LIPITOR) 20 MG tablet, Take 1 tablet by mouth Every Night., Disp: 90 tablet, Rfl: 1  •  Cholecalciferol (VITAMIN D-3) 5000 units tablet, Take 5,000 Units by mouth Daily. To start after the 50,000 daily for 1 week, Disp: 90 tablet, Rfl: 1  •  diclofenac (VOLTAREN) 75 MG EC tablet, , Disp: , Rfl:   •  diltiaZEM CD (DILTIAZEM CD) 180 MG 24 hr capsule, Take 180 mg by mouth Daily., Disp: , Rfl:   •  FREESTYLE LITE test strip, Test 3 times daily, Disp: 300 each, Rfl: 2  •  gabapentin (NEURONTIN) 300 MG capsule, Take 2 capsules by mouth Daily., Disp: 90 capsule, Rfl: 1  •  Lancets (FREESTYLE) lancets, Test 3 times daily, Disp: 300 each, Rfl: 2  •  losartan-hydrochlorothiazide (HYZAAR) 50-12.5 MG per tablet, Take 1 tablet by mouth Daily., Disp: 90 tablet, Rfl: 1  •  omeprazole (priLOSEC) 20 MG capsule, , Disp: , Rfl:   •  pioglitazone (ACTOS) 30 MG tablet, Take 1 tablet by mouth Daily., Disp: 90 tablet, Rfl: 1  •  tamsulosin (FLOMAX) 0.4 MG capsule 24 hr capsule, TK ONE C PO  BID, Disp: , Rfl: 1  •  TRULICITY 1.5 MG/0.5ML solution pen-injector, Inject 1.5 mg under the skin into the appropriate area as directed 1 (One) Time Per Week., Disp: 12 pen, Rfl: 3  •  VASCEPA 1 g capsule capsule, Take 2 g by mouth 2 (Two) Times a Day With Meals., Disp: 360 capsule, Rfl: 1  •  XIGDUO XR 5-1000 MG tablet, Take 1 tablet by mouth 2 (Two) Times a Day., Disp: 180 tablet, Rfl: 1  •  nateglinide (STARLIX) 60 MG tablet, Take 1 tablet by mouth 3 (Three) Times a Day Before Meals., Disp: 270 tablet, Rfl: 1    Patient Active Problem List    Diagnosis   • Vitamin D deficiency [E55.9]   • Diabetes mellitus type 2, uncontrolled, with complications (CMS/HCC) [E11.8, E11.65]   • Mixed hyperlipidemia [E78.2]   • Essential hypertension [I10]   • Onychomycosis [B35.1]   • Corn of foot [L84]   • Hypervitaminosis D  [E67.3]       Review of Systems   A comprehensive review of the 14 systems was  "negative except of listed below:  Endocrine: hyperglycemia     Objective:     Wt Readings from Last 3 Encounters:   09/26/19 87.1 kg (192 lb)   05/21/19 86.6 kg (191 lb)   02/05/19 87.1 kg (192 lb)     Temp Readings from Last 3 Encounters:   No data found for Temp     BP Readings from Last 3 Encounters:   09/26/19 124/76   05/21/19 124/74   02/05/19 138/86     Pulse Readings from Last 3 Encounters:   No data found for Pulse        /76   Ht 182.9 cm (72.01\")   Wt 87.1 kg (192 lb)   BMI 26.03 kg/m²        Physical Exam   Constitutional: He is oriented to person, place, and time. He appears well-developed and well-nourished. No distress.   HENT:   Head: Normocephalic and atraumatic.   Eyes: EOM are normal. Pupils are equal, round, and reactive to light.   Neck: Normal range of motion. Neck supple. No thyromegaly present.   Cardiovascular: Normal rate, regular rhythm, normal heart sounds and intact distal pulses.   No murmur heard.  Pulmonary/Chest: Effort normal and breath sounds normal.   Abdominal: Soft. Bowel sounds are normal.   Musculoskeletal: Normal range of motion.   Neurological: He is alert and oriented to person, place, and time.   Skin: Skin is warm and dry. Capillary refill takes 2 to 3 seconds. He is not diaphoretic.   Psychiatric: He has a normal mood and affect. His behavior is normal. Judgment and thought content normal.   Nursing note and vitals reviewed.          Lab Review  Results for orders placed or performed in visit on 09/06/19   T4 & TSH ([facility])   Result Value Ref Range    TSH 1.590 0.270 - 4.200 uIU/mL    T4, Total 6.37 4.50 - 11.70 mcg/dL   Uric Acid   Result Value Ref Range    Uric Acid 4.9 3.4 - 7.0 mg/dL   Vitamin D 25 Hydroxy   Result Value Ref Range    25 Hydroxy, Vitamin D 55.0 30.0 - 100.0 ng/ml   Comprehensive Metabolic Panel   Result Value Ref Range    Glucose 146 (H) 65 - 99 mg/dL    BUN 33 (H) 8 - 23 mg/dL    Creatinine 1.47 (H) 0.76 - 1.27 mg/dL    eGFR Non African Am " 47 (L) >60 mL/min/1.73    eGFR African Am 58 (L) >60 mL/min/1.73    BUN/Creatinine Ratio 22.4 7.0 - 25.0    Sodium 143 136 - 145 mmol/L    Potassium 4.5 3.5 - 5.2 mmol/L    Chloride 101 98 - 107 mmol/L    Total CO2 27.1 22.0 - 29.0 mmol/L    Calcium 10.0 8.6 - 10.5 mg/dL    Total Protein 7.3 6.0 - 8.5 g/dL    Albumin 4.60 3.50 - 5.20 g/dL    Globulin 2.7 gm/dL    A/G Ratio 1.7 g/dL    Total Bilirubin 0.5 0.2 - 1.2 mg/dL    Alkaline Phosphatase 80 39 - 117 U/L    AST (SGOT) 19 1 - 40 U/L    ALT (SGPT) 33 1 - 41 U/L   C-Peptide   Result Value Ref Range    C-Peptide 4.8 (H) 1.1 - 4.4 ng/mL   Follicle Stimulating Hormone   Result Value Ref Range    FSH 5.4 1.5 - 12.4 mIU/mL   Lipid Panel   Result Value Ref Range    Total Cholesterol 129 0 - 200 mg/dL    Triglycerides 87 0 - 150 mg/dL    HDL Cholesterol 42 40 - 60 mg/dL    VLDL Cholesterol 17.4 mg/dL    LDL Cholesterol  70 0 - 100 mg/dL   MicroAlbumin, Urine, Random - Urine, Clean Catch   Result Value Ref Range    Microalbumin, Urine 8.0 Not Estab. ug/mL   Hemoglobin A1c   Result Value Ref Range    Hemoglobin A1C 7.70 (H) 4.80 - 5.60 %   Cardiovascular Risk Assessment   Result Value Ref Range    Interpretation Note    Diabetes Patient Education   Result Value Ref Range    PDF Image Not applicable            Assessment:   Emmanuel was seen today for follow-up.    Diagnoses and all orders for this visit:    Diabetes mellitus type 2, uncontrolled, with complications (CMS/[facility])  -     nateglinide (STARLIX) 60 MG tablet; Take 1 tablet by mouth 3 (Three) Times a Day Before Meals.  -     XIGDUO XR 5-1000 MG tablet; Take 1 tablet by mouth 2 (Two) Times a Day.  -     pioglitazone (ACTOS) 30 MG tablet; Take 1 tablet by mouth Daily.  -     TRULICITY 1.5 MG/0.5ML solution pen-injector; Inject 1.5 mg under the skin into the appropriate area as directed 1 (One) Time Per Week.  -     Fructosamine; Future  -     C-Peptide; Future  -     Comprehensive Metabolic Panel; Future  -      Hemoglobin A1c; Future  -     Insulin, Total; Future  -     Lipid Panel; Future  -     Microalbumin / Creatinine Urine Ratio - Urine, Clean Catch; Future  -     Uric Acid; Future  -     Vitamin D 25 Hydroxy; Future  -     TSH; Future  -     T4, Free; Future  -     Thyroid Antibodies; Future  -     T3, Free; Future    Mixed hyperlipidemia  -     atorvastatin (LIPITOR) 20 MG tablet; Take 1 tablet by mouth Every Night.  -     VASCEPA 1 g capsule capsule; Take 2 g by mouth 2 (Two) Times a Day With Meals.  -     Comprehensive Metabolic Panel; Future  -     Lipid Panel; Future    Essential hypertension  -     losartan-hydrochlorothiazide (HYZAAR) 50-12.5 MG per tablet; Take 1 tablet by mouth Daily.  -     Comprehensive Metabolic Panel; Future    Vitamin D deficiency  -     Cholecalciferol (VITAMIN D-3) 5000 units tablet; Take 5,000 Units by mouth Daily. To start after the 50,000 daily for 1 week  -     Comprehensive Metabolic Panel; Future  -     Vitamin D 25 Hydroxy; Future    Diabetic autonomic neuropathy associated with type 2 diabetes mellitus (CMS/HCC)  -     gabapentin (NEURONTIN) 300 MG capsule; Take 2 capsules by mouth Daily.  -     Comprehensive Metabolic Panel; Future          Plan:   In summary/ Medication changes: I met with this patient is metabolically stable and doing well at this time.  Laboratory testing was reviewed dated 9.6.2019, discussed with questions and answers completed.  Discussed and formulate a treatment plan with patient, patient verbally stated understood all instructions.    1. Diagnoses and all orders for this visit:     Diabetes mellitus type 2, uncontrolled, with complications (CMS/HCC)- chronic, uncontrolled.  Medication changes were as follows  Starting Starlix 60mg before each meal.     Mixed hyperlipidemia- chronic, stable no medication changes at this time. Refills prescribed. Future labs ordered for upcoming appointment and assessment.      Essential hypertension- chronic, stable  no medication changes at this time. Refills prescribed. Future labs ordered for upcoming appointment and assessment.      Vitamin D deficiency- chronic, stable no medication changes at this time. Refills prescribed. Future labs ordered for upcoming appointment and assessment.     -    Diabetic autonomic neuropathy associated with type 2 diabetes mellitus (CMS/HCC)- chronic, stable no medication changes at this time. Refills prescribed. Future labs ordered for upcoming appointment and assessment.        Instructions:  home blood tests -  Blood glucose testing: 3 times daily, that are:  fasting- 1st thing in morning before eating or drinking  before each meal and 1 or 2 hours after meal  bedtime  anytime you feel symptoms of hyperglycemia or hypoglycemia (high or low blood sugars)        Education:  interpretation of lab results, blood sugar goals, complications of diabetes mellitus, hypoglycemia prevention and treatment, exercise, illness management, self-monitoring of blood glucose skills, nutrition, carbohydrate counting and site rotation        The total face to face time spent was  25 minutes  with additional education given: 14 minutes (greater than 50% of the total time) was spent with counseling and coordination of care on: SMBG with goals, Side effects profiles with medications, medication use and purposes,     Return in about 3 months (around 12/26/2019), or if symptoms worsen or fail to improve, for Recheck. 3 months with Josephine-2 weeks prior for labs 6 months with Dr. Ojeda-2 weeks prior for labs      Dragon transcription disclaimer     Much of this encounter note is an electronic transcription/translation of spoken language to printed text. The electronic translation of spoken language may permit erroneous, or at times, nonsensical words or phrases to be inadvertently transcribed. Although I have reviewed the note for such errors, some may still exist.

## 2019-11-07 ENCOUNTER — TELEPHONE (OUTPATIENT)
Dept: ENDOCRINOLOGY | Age: 69
End: 2019-11-07

## 2019-11-07 DIAGNOSIS — E78.2 MIXED HYPERLIPIDEMIA: ICD-10-CM

## 2019-11-07 RX ORDER — ATORVASTATIN CALCIUM 20 MG/1
20 TABLET, FILM COATED ORAL NIGHTLY
Qty: 90 TABLET | Refills: 1 | Status: SHIPPED | OUTPATIENT
Start: 2019-11-07 | End: 2020-02-13 | Stop reason: SDUPTHER

## 2019-11-07 NOTE — TELEPHONE ENCOUNTER
PATIENT WALKED IN TODAY AND NEEDS PRESCRIPTION FOR ATORVASTIN 20 MG 90 DAY SUPPLY SENT TO [facility]

## 2019-12-26 ENCOUNTER — RESULTS ENCOUNTER (OUTPATIENT)
Dept: ENDOCRINOLOGY | Age: 69
End: 2019-12-26

## 2019-12-26 DIAGNOSIS — E11.43 DIABETIC AUTONOMIC NEUROPATHY ASSOCIATED WITH TYPE 2 DIABETES MELLITUS (HCC): ICD-10-CM

## 2019-12-26 DIAGNOSIS — I10 ESSENTIAL HYPERTENSION: ICD-10-CM

## 2019-12-26 DIAGNOSIS — E55.9 VITAMIN D DEFICIENCY: ICD-10-CM

## 2019-12-26 DIAGNOSIS — E78.2 MIXED HYPERLIPIDEMIA: ICD-10-CM

## 2019-12-26 DIAGNOSIS — IMO0002 DIABETES MELLITUS TYPE 2, UNCONTROLLED, WITH COMPLICATIONS: ICD-10-CM

## 2020-02-13 DIAGNOSIS — IMO0002 DIABETES MELLITUS TYPE 2, UNCONTROLLED, WITH COMPLICATIONS: ICD-10-CM

## 2020-02-13 DIAGNOSIS — E55.9 VITAMIN D DEFICIENCY: ICD-10-CM

## 2020-02-13 DIAGNOSIS — E78.2 MIXED HYPERLIPIDEMIA: ICD-10-CM

## 2020-02-13 RX ORDER — ATORVASTATIN CALCIUM 20 MG/1
20 TABLET, FILM COATED ORAL NIGHTLY
Qty: 30 TABLET | Refills: 1 | Status: SHIPPED | OUTPATIENT
Start: 2020-02-13 | End: 2020-04-03 | Stop reason: SDUPTHER

## 2020-02-13 RX ORDER — PIOGLITAZONEHYDROCHLORIDE 30 MG/1
30 TABLET ORAL DAILY
Qty: 30 TABLET | Refills: 1 | Status: SHIPPED | OUTPATIENT
Start: 2020-02-13 | End: 2020-04-03 | Stop reason: SDUPTHER

## 2020-02-13 RX ORDER — OMEPRAZOLE 20 MG/1
20 CAPSULE, DELAYED RELEASE ORAL DAILY
Qty: 30 CAPSULE | Refills: 1 | Status: SHIPPED | OUTPATIENT
Start: 2020-02-13

## 2020-03-20 ENCOUNTER — LAB (OUTPATIENT)
Dept: ENDOCRINOLOGY | Age: 70
End: 2020-03-20

## 2020-03-20 DIAGNOSIS — E78.2 MIXED HYPERLIPIDEMIA: Primary | ICD-10-CM

## 2020-03-20 DIAGNOSIS — E67.3 HYPERVITAMINOSIS D: ICD-10-CM

## 2020-03-20 DIAGNOSIS — E55.9 VITAMIN D DEFICIENCY: ICD-10-CM

## 2020-03-20 DIAGNOSIS — E78.2 MIXED HYPERLIPIDEMIA: ICD-10-CM

## 2020-03-20 DIAGNOSIS — IMO0002 DIABETES MELLITUS TYPE 2, UNCONTROLLED, WITH COMPLICATIONS: ICD-10-CM

## 2020-03-21 LAB
25(OH)D3+25(OH)D2 SERPL-MCNC: 58 NG/ML (ref 30–100)
ALBUMIN SERPL-MCNC: 4.3 G/DL (ref 3.5–5.2)
ALBUMIN/GLOB SERPL: 1.7 G/DL
ALP SERPL-CCNC: 75 U/L (ref 39–117)
ALT SERPL-CCNC: 26 U/L (ref 1–41)
AST SERPL-CCNC: 13 U/L (ref 1–40)
BILIRUB SERPL-MCNC: 0.5 MG/DL (ref 0.2–1.2)
BUN SERPL-MCNC: 33 MG/DL (ref 8–23)
BUN/CREAT SERPL: 20.9 (ref 7–25)
C PEPTIDE SERPL-MCNC: 4.7 NG/ML (ref 1.1–4.4)
CALCIUM SERPL-MCNC: 9.2 MG/DL (ref 8.6–10.5)
CHLORIDE SERPL-SCNC: 101 MMOL/L (ref 98–107)
CHOLEST SERPL-MCNC: 133 MG/DL (ref 0–200)
CO2 SERPL-SCNC: 25.5 MMOL/L (ref 22–29)
CREAT SERPL-MCNC: 1.58 MG/DL (ref 0.76–1.27)
GLOBULIN SER CALC-MCNC: 2.5 GM/DL
GLUCOSE SERPL-MCNC: 124 MG/DL (ref 65–99)
HBA1C MFR BLD: 7.9 % (ref 4.8–5.6)
HDLC SERPL-MCNC: 37 MG/DL (ref 40–60)
INTERPRETATION: NORMAL
LDLC SERPL CALC-MCNC: 66 MG/DL (ref 0–100)
Lab: NORMAL
MICROALBUMIN UR-MCNC: 11.6 UG/ML
POTASSIUM SERPL-SCNC: 4.8 MMOL/L (ref 3.5–5.2)
PROT SERPL-MCNC: 6.8 G/DL (ref 6–8.5)
SODIUM SERPL-SCNC: 140 MMOL/L (ref 136–145)
T3FREE SERPL-MCNC: 3.3 PG/ML (ref 2–4.4)
T4 FREE SERPL-MCNC: 1.54 NG/DL (ref 0.93–1.7)
T4 SERPL-MCNC: 6.63 MCG/DL (ref 4.5–11.7)
TRIGL SERPL-MCNC: 149 MG/DL (ref 0–150)
TSH SERPL DL<=0.005 MIU/L-ACNC: 1.85 UIU/ML (ref 0.27–4.2)
URATE SERPL-MCNC: 5.3 MG/DL (ref 3.4–7)
VLDLC SERPL CALC-MCNC: 29.8 MG/DL

## 2020-04-03 ENCOUNTER — OFFICE VISIT (OUTPATIENT)
Dept: ENDOCRINOLOGY | Age: 70
End: 2020-04-03

## 2020-04-03 ENCOUNTER — TELEPHONE (OUTPATIENT)
Dept: ENDOCRINOLOGY | Age: 70
End: 2020-04-03

## 2020-04-03 DIAGNOSIS — I10 ESSENTIAL HYPERTENSION: ICD-10-CM

## 2020-04-03 DIAGNOSIS — IMO0002 DIABETES MELLITUS TYPE 2, UNCONTROLLED, WITH COMPLICATIONS: Primary | ICD-10-CM

## 2020-04-03 DIAGNOSIS — E78.2 MIXED HYPERLIPIDEMIA: ICD-10-CM

## 2020-04-03 DIAGNOSIS — E67.3 HYPERVITAMINOSIS D: ICD-10-CM

## 2020-04-03 DIAGNOSIS — E55.9 VITAMIN D DEFICIENCY: ICD-10-CM

## 2020-04-03 PROCEDURE — 99214 OFFICE O/P EST MOD 30 MIN: CPT | Performed by: INTERNAL MEDICINE

## 2020-04-03 RX ORDER — NATEGLINIDE 120 MG/1
120 TABLET ORAL
Qty: 270 TABLET | Refills: 3 | Status: SHIPPED | OUTPATIENT
Start: 2020-04-03 | End: 2020-05-12

## 2020-04-03 RX ORDER — PIOGLITAZONEHYDROCHLORIDE 30 MG/1
30 TABLET ORAL DAILY
Qty: 90 TABLET | Refills: 3 | Status: SHIPPED | OUTPATIENT
Start: 2020-04-03 | End: 2020-05-29 | Stop reason: SDUPTHER

## 2020-04-03 RX ORDER — ATORVASTATIN CALCIUM 20 MG/1
20 TABLET, FILM COATED ORAL NIGHTLY
Qty: 90 TABLET | Refills: 3 | Status: SHIPPED | OUTPATIENT
Start: 2020-04-03 | End: 2020-05-29 | Stop reason: SDUPTHER

## 2020-04-03 RX ORDER — DAPAGLIFLOZIN AND METFORMIN HYDROCHLORIDE 5; 1000 MG/1; MG/1
2 TABLET, FILM COATED, EXTENDED RELEASE ORAL DAILY
Qty: 180 TABLET | Refills: 3 | Status: SHIPPED | OUTPATIENT
Start: 2020-04-03 | End: 2020-05-29 | Stop reason: SDUPTHER

## 2020-04-03 RX ORDER — ICOSAPENT ETHYL 1000 MG/1
2 CAPSULE ORAL 2 TIMES DAILY WITH MEALS
Qty: 360 CAPSULE | Refills: 3 | Status: SHIPPED | OUTPATIENT
Start: 2020-04-03 | End: 2020-05-29 | Stop reason: SDUPTHER

## 2020-04-03 RX ORDER — BLOOD-GLUCOSE METER
KIT MISCELLANEOUS
Qty: 300 EACH | Refills: 3 | Status: SHIPPED | OUTPATIENT
Start: 2020-04-03 | End: 2020-04-27 | Stop reason: SDUPTHER

## 2020-04-03 RX ORDER — DULAGLUTIDE 1.5 MG/.5ML
1.5 INJECTION, SOLUTION SUBCUTANEOUS WEEKLY
Qty: 12 PEN | Refills: 3 | Status: SHIPPED | OUTPATIENT
Start: 2020-04-03 | End: 2020-04-27 | Stop reason: SDUPTHER

## 2020-04-03 RX ORDER — DILTIAZEM HYDROCHLORIDE 180 MG/1
180 CAPSULE, COATED, EXTENDED RELEASE ORAL DAILY
Qty: 90 CAPSULE | Refills: 3 | Status: SHIPPED | OUTPATIENT
Start: 2020-04-03

## 2020-04-03 RX ORDER — LANCETS 28 GAUGE
EACH MISCELLANEOUS
Qty: 300 EACH | Refills: 2 | Status: SHIPPED | OUTPATIENT
Start: 2020-04-03 | End: 2020-04-27 | Stop reason: SDUPTHER

## 2020-04-03 NOTE — PROGRESS NOTES
Subjective   Emmanuel Roach is a 70 y.o. male follow up type 2 dm lab review checks blood sugar 2 times daily pt stats no concerns needs a refills  History of Present Illness this is a 70-year-old gentleman known patient with type 2 diabetes hypertension and dyslipidemia as well as vitamin D deficiency.  Over the course of last 6 months he has had no significant health problem for which to go to the ER or hospital however for the past couple of years he has been dealing with prostate cancer and told me that he will be having surgery.  Because of having problem with video encounter this was a telephone encounter.    The following portions of the patient's history were reviewed and updated as appropriate: allergies, current medications, past family history, past medical history, past social history, past surgical history and problem list.    Review of Systems   Constitutional: Negative for appetite change, fatigue and fever.   Eyes: Negative for visual disturbance.   Respiratory: Negative for shortness of breath.    Cardiovascular: Negative for palpitations and leg swelling.   Gastrointestinal: Negative for abdominal pain and vomiting.   Endocrine: Negative for polydipsia and polyuria.   Musculoskeletal: Negative for joint swelling and neck pain.   Skin: Negative for rash.   Neurological: Negative for weakness and numbness.   Psychiatric/Behavioral: Negative for behavioral problems.   The above-mentioned review of systems were reviewed, corroborated and accepted.    Objective   Physical Exam      Assessment/Plan   Diagnoses and all orders for this visit:    Diabetes mellitus type 2, uncontrolled, with complications (CMS/[facility])  -     pioglitazone (Actos) 30 MG tablet; Take 1 tablet by mouth Daily.  -     TRULICITY 1.5 MG/0.5ML solution pen-injector; Inject 1.5 mg under the skin into the appropriate area as directed 1 (One) Time Per Week.  -     XIGDUO XR 5-1000 MG tablet; Take 2 tablets by mouth Daily.  -     T4 &  TSH ([facility]); Future  -     Uric Acid; Future  -     Vitamin D 25 Hydroxy; Future  -     Comprehensive Metabolic Panel; Future  -     C-Peptide; Future  -     Hemoglobin A1c; Future  -     MicroAlbumin, Urine, Random - Urine, Clean Catch; Future  -     NMR LipoProfile; Future    Mixed hyperlipidemia  -     atorvastatin (LIPITOR) 20 MG tablet; Take 1 tablet by mouth Every Night.  -     VASCEPA 1 g capsule capsule; Take 2 g by mouth 2 (Two) Times a Day With Meals.  -     T4 & TSH ([facility]); Future  -     Uric Acid; Future  -     Vitamin D 25 Hydroxy; Future  -     Comprehensive Metabolic Panel; Future  -     C-Peptide; Future  -     Hemoglobin A1c; Future  -     MicroAlbumin, Urine, Random - Urine, Clean Catch; Future  -     NMR LipoProfile; Future    Essential hypertension  -     T4 & TSH ([facility]); Future  -     Uric Acid; Future  -     Vitamin D 25 Hydroxy; Future  -     Comprehensive Metabolic Panel; Future  -     C-Peptide; Future  -     Hemoglobin A1c; Future  -     MicroAlbumin, Urine, Random - Urine, Clean Catch; Future  -     NMR LipoProfile; Future    Hypervitaminosis D   -     T4 & TSH ([facility]); Future  -     Uric Acid; Future  -     Vitamin D 25 Hydroxy; Future  -     Comprehensive Metabolic Panel; Future  -     C-Peptide; Future  -     Hemoglobin A1c; Future  -     MicroAlbumin, Urine, Random - Urine, Clean Catch; Future  -     NMR LipoProfile; Future    Vitamin D deficiency  -     T4 & TSH ([facility]); Future  -     Uric Acid; Future  -     Vitamin D 25 Hydroxy; Future  -     Comprehensive Metabolic Panel; Future  -     C-Peptide; Future  -     Hemoglobin A1c; Future  -     MicroAlbumin, Urine, Random - Urine, Clean Catch; Future  -     NMR LipoProfile; Future    Other orders  -     FREESTYLE LITE test strip; Test 3 times daily  -     Lancets (FREESTYLE) lancets; Test 3 times daily  -     nateglinide (STARLIX) 120 MG tablet; Take 1 tablet by mouth 3 (Three) Times a Day Before Meals.  -     dilTIAZem CD  (dilTIAZem CD) 180 MG 24 hr capsule; Take 1 capsule by mouth Daily.      In summary I had a telephone interview with Mr. Roach and went over the laboratory evaluation of 3/20/2020 and will be sending him on a hard copy of it.  Overall he is clinically and metabolically stable and therefore we will go ahead and continue all his current prescriptions.  I will see him in 6 months or sooner if needed with laboratory evaluation prior to each office visit.

## 2020-04-27 DIAGNOSIS — IMO0002 DIABETES MELLITUS TYPE 2, UNCONTROLLED, WITH COMPLICATIONS: ICD-10-CM

## 2020-04-27 DIAGNOSIS — E55.9 VITAMIN D DEFICIENCY: ICD-10-CM

## 2020-04-27 RX ORDER — DULAGLUTIDE 1.5 MG/.5ML
1.5 INJECTION, SOLUTION SUBCUTANEOUS WEEKLY
Qty: 12 PEN | Refills: 3 | Status: SHIPPED | OUTPATIENT
Start: 2020-04-27 | End: 2020-05-29 | Stop reason: SDUPTHER

## 2020-04-27 RX ORDER — LANCETS 28 GAUGE
EACH MISCELLANEOUS
Qty: 300 EACH | Refills: 2 | Status: SHIPPED | OUTPATIENT
Start: 2020-04-27 | End: 2021-05-28 | Stop reason: SDUPTHER

## 2020-04-27 RX ORDER — BLOOD-GLUCOSE METER
KIT MISCELLANEOUS
Qty: 300 EACH | Refills: 3 | Status: SHIPPED | OUTPATIENT
Start: 2020-04-27 | End: 2020-04-27 | Stop reason: SDUPTHER

## 2020-04-27 RX ORDER — DULAGLUTIDE 1.5 MG/.5ML
1.5 INJECTION, SOLUTION SUBCUTANEOUS WEEKLY
Qty: 12 PEN | Refills: 3 | Status: SHIPPED | OUTPATIENT
Start: 2020-04-27 | End: 2020-04-27 | Stop reason: SDUPTHER

## 2020-04-27 RX ORDER — BLOOD-GLUCOSE METER
KIT MISCELLANEOUS
Qty: 300 EACH | Refills: 3 | Status: SHIPPED | OUTPATIENT
Start: 2020-04-27 | End: 2021-05-28 | Stop reason: SDUPTHER

## 2020-04-27 RX ORDER — LANCETS 28 GAUGE
EACH MISCELLANEOUS
Qty: 300 EACH | Refills: 2 | Status: SHIPPED | OUTPATIENT
Start: 2020-04-27 | End: 2020-04-27 | Stop reason: SDUPTHER

## 2020-05-12 ENCOUNTER — APPOINTMENT (OUTPATIENT)
Dept: PREADMISSION TESTING | Facility: HOSPITAL | Age: 70
End: 2020-05-12

## 2020-05-12 ENCOUNTER — TRANSCRIBE ORDERS (OUTPATIENT)
Dept: SLEEP MEDICINE | Facility: HOSPITAL | Age: 70
End: 2020-05-12

## 2020-05-12 VITALS
HEIGHT: 72 IN | OXYGEN SATURATION: 97 % | BODY MASS INDEX: 25.87 KG/M2 | HEART RATE: 87 BPM | RESPIRATION RATE: 16 BRPM | TEMPERATURE: 98.1 F | DIASTOLIC BLOOD PRESSURE: 97 MMHG | WEIGHT: 191 LBS | SYSTOLIC BLOOD PRESSURE: 166 MMHG

## 2020-05-12 DIAGNOSIS — Z01.818 OTHER SPECIFIED PRE-OPERATIVE EXAMINATION: Primary | ICD-10-CM

## 2020-05-12 LAB
ANION GAP SERPL CALCULATED.3IONS-SCNC: 15.6 MMOL/L (ref 5–15)
BUN BLD-MCNC: 24 MG/DL (ref 8–23)
BUN/CREAT SERPL: 21.1 (ref 7–25)
CALCIUM SPEC-SCNC: 9.6 MG/DL (ref 8.6–10.5)
CHLORIDE SERPL-SCNC: 99 MMOL/L (ref 98–107)
CO2 SERPL-SCNC: 24.4 MMOL/L (ref 22–29)
CREAT BLD-MCNC: 1.14 MG/DL (ref 0.76–1.27)
DEPRECATED RDW RBC AUTO: 48 FL (ref 37–54)
ERYTHROCYTE [DISTWIDTH] IN BLOOD BY AUTOMATED COUNT: 12.9 % (ref 12.3–15.4)
GFR SERPL CREATININE-BSD FRML MDRD: 64 ML/MIN/1.73
GLUCOSE BLD-MCNC: 167 MG/DL (ref 65–99)
HCT VFR BLD AUTO: 47.8 % (ref 37.5–51)
HGB BLD-MCNC: 16.2 G/DL (ref 13–17.7)
MCH RBC QN AUTO: 33.8 PG (ref 26.6–33)
MCHC RBC AUTO-ENTMCNC: 33.9 G/DL (ref 31.5–35.7)
MCV RBC AUTO: 99.8 FL (ref 79–97)
PLATELET # BLD AUTO: 181 10*3/MM3 (ref 140–450)
PMV BLD AUTO: 11.7 FL (ref 6–12)
POTASSIUM BLD-SCNC: 4 MMOL/L (ref 3.5–5.2)
RBC # BLD AUTO: 4.79 10*6/MM3 (ref 4.14–5.8)
SODIUM BLD-SCNC: 139 MMOL/L (ref 136–145)
WBC NRBC COR # BLD: 6.36 10*3/MM3 (ref 3.4–10.8)

## 2020-05-12 PROCEDURE — 85027 COMPLETE CBC AUTOMATED: CPT | Performed by: UROLOGY

## 2020-05-12 PROCEDURE — 93010 ELECTROCARDIOGRAM REPORT: CPT | Performed by: INTERNAL MEDICINE

## 2020-05-12 PROCEDURE — 36415 COLL VENOUS BLD VENIPUNCTURE: CPT

## 2020-05-12 PROCEDURE — 93005 ELECTROCARDIOGRAM TRACING: CPT

## 2020-05-12 PROCEDURE — 80048 BASIC METABOLIC PNL TOTAL CA: CPT | Performed by: UROLOGY

## 2020-05-12 RX ORDER — ASCORBIC ACID 1000 MG
1 TABLET, EXTENDED RELEASE ORAL DAILY
COMMUNITY

## 2020-05-12 NOTE — DISCHARGE INSTRUCTIONS
Take the following medications the morning of surgery with a small sip of water: DILTIAZEM AND OMEPRAZOLE    ARRIVALTIME 05:30          General Instructions:  • Do not eat or drink anything after midnight the night before surgery.  • Infants may have breast milk up to four hours before surgery.  • Infants drinking formula may drink formula up to six hours before surgery.   • Patients who avoid smoking, chewing tobacco and alcohol for 4 weeks prior to surgery have a reduced risk of post-operative complications.  Quit smoking as many days before surgery as you can.  • Do not smoke, use chewing tobacco or drink alcohol the day of surgery.   • If applicable bring your C-PAP/ BI-PAP machine.  • Bring any papers given to you in the doctor’s office.  • Wear clean comfortable clothes.  • Do not wear contact lenses, false eyelashes or make-up.  Bring a case for your glasses.   • Bring crutches or walker if applicable.  • Remove all piercings.  Leave jewelry and any other valuables at home.  • Hair extensions with metal clips must be removed prior to surgery.  • The Pre-Admission Testing nurse will instruct you to bring medications if unable to obtain an accurate list in Pre-Admission Testing.            Preventing a Surgical Site Infection:  • For 2 to 3 days before surgery, avoid shaving with a razor because the razor can irritate skin and make it easier to develop an infection.    • Any areas of open skin can increase the risk of a post-operative wound infection by allowing bacteria to enter and travel throughout the body.  Notify your surgeon if you have any skin wounds / rashes even if it is not near the expected surgical site.  The area will need assessed to determine if surgery should be delayed until it is healed.  • The night prior to surgery sleep in a clean bed with clean clothing.  Do not allow pets to sleep with you.  • Shower on the morning of surgery using a fresh bar of anti-bacterial soap (such as Dial) and  clean washcloth.  Dry with a clean towel and dress in clean clothing.  • Ask your surgeon if you will be receiving antibiotics prior to surgery.  • Make sure you, your family, and all healthcare providers clean their hands with soap and water or an alcohol based hand  before caring for you or your wound.    Day of surgery:  Your arrival time is approximately two hours before your scheduled surgery time.  Upon arrival, a Pre-op nurse and Anesthesiologist will review your health history, obtain vital signs, and answer questions you may have.  The only belongings needed at this time will be your home medications and if applicable your C-PAP/BI-PAP machine.  If you are staying overnight your family can leave the rest of your belongings in the car and bring them to your room later.  A Pre-op nurse will start an IV and you may receive medication in preparation for surgery, including something to help you relax.  Your family will be able to see you in the Pre-op area.  Two visitors at a time will be allowed in the Pre-op room.  While you are in surgery your family should notify the waiting room  if they leave the waiting room area and provide a contact phone number.    Please be aware that surgery does come with discomfort.  We want to make every effort to control your discomfort so please discuss any uncontrolled symptoms with your nurse.   Your doctor will most likely have prescribed pain medications.      If you are going home after surgery you will receive individualized written care instructions before being discharged.  A responsible adult must drive you to and from the hospital on the day of your surgery and stay with you for 24 hours.    If you are staying overnight following surgery, you will be transported to your hospital room following the recovery period.  [facility] has all private rooms.    If you have any questions please call Pre-Admission Testing at  [phone].  Deductibles and co-payments are collected on the day of service. Please be prepared to pay the required co-pay, deductible or deposit on the day of service as defined by your plan.    Self-Quarantine Prior to Surgery    • Stay at home. Do not leave your home after you have been given the Covid test for your upcoming surgery.   • Wash your hands often with soap and water for at least 20 seconds especially after you have been in a public place, or after blowing your nose, coughing, or sneezing.  • If soap and water are not readily available, use a hand  that contains at least 60% alcohol. Cover all surfaces of your hands and rub them together until they feel dry.  • Avoid touching your eyes, nose, and mouth with unwashed hands.  • Take everyday precautions to keep space between yourself and others (stay 6 feet away, which is about two arm lengths).  Remember that some people without symptoms may be able to spread virus.  • You should stay in a specific room away from others if possible.  Stay at least 6 feet away from others in the home if you cannot have a dedicated room to yourself. Do not have visitors.   • Wear a mask around others in your home if you are unable to stay in a separate room or 6 feet apart. If you are unable to wear a mask, have your family member wear a mask if they must be within 6 feet of you.   • Do not snuggle with your pet. While the CDC says there is no evidence that pets can spread COVID-19 or be infected from humans, it is probably best to avoid “petting, snuggling, being kissed or licked and sharing food (during self-quarantine)”, according to the CDC.   • Do not share anything - Have your own utensils, drinking glass, dishes, towels and bedding.   • Do not share a bathroom with others, if possible.   • Clean and disinfect frequently touched services daily. This includes tables, doorknobs, light switches, countertops, handles, desks, phones, keyboards, toilets,  faucets, and sinks.  • Do not travel prior to surgery.    Call your surgeon immediately if you experience any of the following symptoms:  • Sore Throat      • Shortness of Breath or difficulty breathing  • Cough  • Chills  • Body soreness or muscle pain  • Headache  • Fever  • New loss of taste or smell  • Do not arrive for your surgery ill.  Your procedure will need to be rescheduled to another time.  You will need to call your physician before the day of surgery to avoid any unnecessary exposure to hospital staff as well as other patients.

## 2020-05-16 ENCOUNTER — APPOINTMENT (OUTPATIENT)
Dept: LAB | Facility: HOSPITAL | Age: 70
End: 2020-05-16

## 2020-05-16 PROCEDURE — U0004 COV-19 TEST NON-CDC HGH THRU: HCPCS | Performed by: INTERNAL MEDICINE

## 2020-05-18 LAB
REF LAB TEST METHOD: NORMAL
SARS-COV-2 RNA RESP QL NAA+PROBE: NOT DETECTED

## 2020-05-19 ENCOUNTER — ANESTHESIA EVENT (OUTPATIENT)
Dept: PERIOP | Facility: HOSPITAL | Age: 70
End: 2020-05-19

## 2020-05-19 ENCOUNTER — ANESTHESIA (OUTPATIENT)
Dept: PERIOP | Facility: HOSPITAL | Age: 70
End: 2020-05-19

## 2020-05-19 ENCOUNTER — HOSPITAL ENCOUNTER (OUTPATIENT)
Facility: HOSPITAL | Age: 70
Discharge: HOME | End: 2020-05-20
Attending: UROLOGY | Admitting: UROLOGY

## 2020-05-19 DIAGNOSIS — C61 PROSTATE CANCER (HCC): ICD-10-CM

## 2020-05-19 LAB
GLUCOSE BLDC GLUCOMTR-MCNC: 143 MG/DL (ref 70–130)
GLUCOSE BLDC GLUCOMTR-MCNC: 182 MG/DL (ref 70–130)

## 2020-05-19 PROCEDURE — 25010000002 ONDANSETRON PER 1 MG: Performed by: NURSE ANESTHETIST, CERTIFIED REGISTERED

## 2020-05-19 PROCEDURE — 88309 TISSUE EXAM BY PATHOLOGIST: CPT | Performed by: UROLOGY

## 2020-05-19 PROCEDURE — 25010000003 CEFAZOLIN IN DEXTROSE 2-4 GM/100ML-% SOLUTION: Performed by: UROLOGY

## 2020-05-19 PROCEDURE — 25010000002 FENTANYL CITRATE (PF) 100 MCG/2ML SOLUTION: Performed by: NURSE ANESTHETIST, CERTIFIED REGISTERED

## 2020-05-19 PROCEDURE — 25010000003 CEFAZOLIN 1-4 GM/50ML-% SOLUTION: Performed by: UROLOGY

## 2020-05-19 PROCEDURE — A9270 NON-COVERED ITEM OR SERVICE: HCPCS | Performed by: UROLOGY

## 2020-05-19 PROCEDURE — 63710000001: Performed by: UROLOGY

## 2020-05-19 PROCEDURE — 25010000002 PROPOFOL 10 MG/ML EMULSION: Performed by: NURSE ANESTHETIST, CERTIFIED REGISTERED

## 2020-05-19 PROCEDURE — 25010000002 HYDROMORPHONE PER 4 MG: Performed by: NURSE ANESTHETIST, CERTIFIED REGISTERED

## 2020-05-19 PROCEDURE — 25810000003 SODIUM CHLORIDE 0.9 % WITH KCL 20 MEQ 20-0.9 MEQ/L-% SOLUTION: Performed by: UROLOGY

## 2020-05-19 PROCEDURE — 82962 GLUCOSE BLOOD TEST: CPT

## 2020-05-19 PROCEDURE — G0378 HOSPITAL OBSERVATION PER HR: HCPCS

## 2020-05-19 PROCEDURE — 25010000002 KETOROLAC TROMETHAMINE PER 15 MG: Performed by: UROLOGY

## 2020-05-19 PROCEDURE — 25010000002 DEXAMETHASONE PER 1 MG: Performed by: NURSE ANESTHETIST, CERTIFIED REGISTERED

## 2020-05-19 PROCEDURE — 25010000002 SUCCINYLCHOLINE PER 20 MG: Performed by: NURSE ANESTHETIST, CERTIFIED REGISTERED

## 2020-05-19 DEVICE — CLIP LIG HEMOLOK PA LG 6CT PRP: Type: IMPLANTABLE DEVICE | Status: FUNCTIONAL

## 2020-05-19 DEVICE — SEALANT WND FIBRIN TISSEEL PREFIL/SYR/PRIMAFZ 10ML: Type: IMPLANTABLE DEVICE | Status: FUNCTIONAL

## 2020-05-19 RX ORDER — PIOGLITAZONEHYDROCHLORIDE 30 MG/1
30 TABLET ORAL DAILY
Status: DISCONTINUED | OUTPATIENT
Start: 2020-05-19 | End: 2020-05-20 | Stop reason: HOSPADM

## 2020-05-19 RX ORDER — KETOROLAC TROMETHAMINE 30 MG/ML
15 INJECTION, SOLUTION INTRAMUSCULAR; INTRAVENOUS EVERY 6 HOURS PRN
Status: DISCONTINUED | OUTPATIENT
Start: 2020-05-19 | End: 2020-05-20 | Stop reason: HOSPADM

## 2020-05-19 RX ORDER — ACETAMINOPHEN 325 MG/1
650 TABLET ORAL EVERY 4 HOURS PRN
Status: DISCONTINUED | OUTPATIENT
Start: 2020-05-19 | End: 2020-05-20 | Stop reason: HOSPADM

## 2020-05-19 RX ORDER — PROMETHAZINE HYDROCHLORIDE 25 MG/1
25 SUPPOSITORY RECTAL ONCE AS NEEDED
Status: DISCONTINUED | OUTPATIENT
Start: 2020-05-19 | End: 2020-05-19 | Stop reason: HOSPADM

## 2020-05-19 RX ORDER — SODIUM CHLORIDE 9 MG/ML
INJECTION, SOLUTION INTRAVENOUS AS NEEDED
Status: DISCONTINUED | OUTPATIENT
Start: 2020-05-19 | End: 2020-05-19 | Stop reason: HOSPADM

## 2020-05-19 RX ORDER — CEFAZOLIN SODIUM 1 G/50ML
1 INJECTION, SOLUTION INTRAVENOUS ONCE
Status: COMPLETED | OUTPATIENT
Start: 2020-05-19 | End: 2020-05-19

## 2020-05-19 RX ORDER — OXYCODONE AND ACETAMINOPHEN 7.5; 325 MG/1; MG/1
1 TABLET ORAL ONCE AS NEEDED
Status: DISCONTINUED | OUTPATIENT
Start: 2020-05-19 | End: 2020-05-19 | Stop reason: HOSPADM

## 2020-05-19 RX ORDER — EPHEDRINE SULFATE 50 MG/ML
5 INJECTION, SOLUTION INTRAVENOUS ONCE AS NEEDED
Status: DISCONTINUED | OUTPATIENT
Start: 2020-05-19 | End: 2020-05-19 | Stop reason: HOSPADM

## 2020-05-19 RX ORDER — CEFAZOLIN SODIUM 2 G/100ML
2 INJECTION, SOLUTION INTRAVENOUS EVERY 8 HOURS
Status: COMPLETED | OUTPATIENT
Start: 2020-05-19 | End: 2020-05-19

## 2020-05-19 RX ORDER — SODIUM CHLORIDE AND POTASSIUM CHLORIDE 150; 900 MG/100ML; MG/100ML
100 INJECTION, SOLUTION INTRAVENOUS CONTINUOUS
Status: DISCONTINUED | OUTPATIENT
Start: 2020-05-19 | End: 2020-05-20 | Stop reason: HOSPADM

## 2020-05-19 RX ORDER — FENTANYL CITRATE 50 UG/ML
INJECTION, SOLUTION INTRAMUSCULAR; INTRAVENOUS AS NEEDED
Status: DISCONTINUED | OUTPATIENT
Start: 2020-05-19 | End: 2020-05-19 | Stop reason: SURG

## 2020-05-19 RX ORDER — ONDANSETRON 2 MG/ML
4 INJECTION INTRAMUSCULAR; INTRAVENOUS ONCE AS NEEDED
Status: DISCONTINUED | OUTPATIENT
Start: 2020-05-19 | End: 2020-05-19 | Stop reason: HOSPADM

## 2020-05-19 RX ORDER — SODIUM CHLORIDE 0.9 % (FLUSH) 0.9 %
3 SYRINGE (ML) INJECTION EVERY 12 HOURS SCHEDULED
Status: DISCONTINUED | OUTPATIENT
Start: 2020-05-19 | End: 2020-05-19 | Stop reason: HOSPADM

## 2020-05-19 RX ORDER — KETAMINE HYDROCHLORIDE 10 MG/ML
INJECTION INTRAMUSCULAR; INTRAVENOUS AS NEEDED
Status: DISCONTINUED | OUTPATIENT
Start: 2020-05-19 | End: 2020-05-19 | Stop reason: SURG

## 2020-05-19 RX ORDER — LIDOCAINE HYDROCHLORIDE 20 MG/ML
INJECTION, SOLUTION INFILTRATION; PERINEURAL AS NEEDED
Status: DISCONTINUED | OUTPATIENT
Start: 2020-05-19 | End: 2020-05-19 | Stop reason: SURG

## 2020-05-19 RX ORDER — PROPOFOL 10 MG/ML
VIAL (ML) INTRAVENOUS AS NEEDED
Status: DISCONTINUED | OUTPATIENT
Start: 2020-05-19 | End: 2020-05-19 | Stop reason: SURG

## 2020-05-19 RX ORDER — NALOXONE HCL 0.4 MG/ML
0.1 VIAL (ML) INJECTION
Status: DISCONTINUED | OUTPATIENT
Start: 2020-05-19 | End: 2020-05-20 | Stop reason: HOSPADM

## 2020-05-19 RX ORDER — PROMETHAZINE HYDROCHLORIDE 25 MG/1
25 TABLET ORAL ONCE AS NEEDED
Status: DISCONTINUED | OUTPATIENT
Start: 2020-05-19 | End: 2020-05-19 | Stop reason: HOSPADM

## 2020-05-19 RX ORDER — DOCUSATE SODIUM 100 MG/1
100 CAPSULE, LIQUID FILLED ORAL 2 TIMES DAILY PRN
Status: DISCONTINUED | OUTPATIENT
Start: 2020-05-19 | End: 2020-05-20 | Stop reason: HOSPADM

## 2020-05-19 RX ORDER — PANTOPRAZOLE SODIUM 40 MG/1
40 TABLET, DELAYED RELEASE ORAL EVERY MORNING
Status: DISCONTINUED | OUTPATIENT
Start: 2020-05-20 | End: 2020-05-19

## 2020-05-19 RX ORDER — FENTANYL CITRATE 50 UG/ML
50 INJECTION, SOLUTION INTRAMUSCULAR; INTRAVENOUS
Status: DISCONTINUED | OUTPATIENT
Start: 2020-05-19 | End: 2020-05-19 | Stop reason: HOSPADM

## 2020-05-19 RX ORDER — SODIUM CHLORIDE, SODIUM LACTATE, POTASSIUM CHLORIDE, CALCIUM CHLORIDE 600; 310; 30; 20 MG/100ML; MG/100ML; MG/100ML; MG/100ML
9 INJECTION, SOLUTION INTRAVENOUS CONTINUOUS
Status: DISCONTINUED | OUTPATIENT
Start: 2020-05-19 | End: 2020-05-20 | Stop reason: HOSPADM

## 2020-05-19 RX ORDER — ONDANSETRON 4 MG/1
4 TABLET, FILM COATED ORAL EVERY 6 HOURS PRN
Status: DISCONTINUED | OUTPATIENT
Start: 2020-05-19 | End: 2020-05-20 | Stop reason: HOSPADM

## 2020-05-19 RX ORDER — HYDROMORPHONE HYDROCHLORIDE 1 MG/ML
0.5 INJECTION, SOLUTION INTRAMUSCULAR; INTRAVENOUS; SUBCUTANEOUS
Status: DISCONTINUED | OUTPATIENT
Start: 2020-05-19 | End: 2020-05-19 | Stop reason: HOSPADM

## 2020-05-19 RX ORDER — FLUMAZENIL 0.1 MG/ML
0.2 INJECTION INTRAVENOUS AS NEEDED
Status: DISCONTINUED | OUTPATIENT
Start: 2020-05-19 | End: 2020-05-19 | Stop reason: HOSPADM

## 2020-05-19 RX ORDER — SODIUM CHLORIDE, SODIUM LACTATE, POTASSIUM CHLORIDE, CALCIUM CHLORIDE 600; 310; 30; 20 MG/100ML; MG/100ML; MG/100ML; MG/100ML
INJECTION, SOLUTION INTRAVENOUS CONTINUOUS PRN
Status: DISCONTINUED | OUTPATIENT
Start: 2020-05-19 | End: 2020-05-19 | Stop reason: SURG

## 2020-05-19 RX ORDER — HYDRALAZINE HYDROCHLORIDE 20 MG/ML
5 INJECTION INTRAMUSCULAR; INTRAVENOUS
Status: DISCONTINUED | OUTPATIENT
Start: 2020-05-19 | End: 2020-05-19 | Stop reason: HOSPADM

## 2020-05-19 RX ORDER — ICOSAPENT ETHYL 1000 MG/1
2 CAPSULE ORAL 2 TIMES DAILY WITH MEALS
Status: DISCONTINUED | OUTPATIENT
Start: 2020-05-19 | End: 2020-05-20 | Stop reason: HOSPADM

## 2020-05-19 RX ORDER — DIPHENHYDRAMINE HCL 25 MG
25 CAPSULE ORAL
Status: DISCONTINUED | OUTPATIENT
Start: 2020-05-19 | End: 2020-05-19 | Stop reason: HOSPADM

## 2020-05-19 RX ORDER — SODIUM CHLORIDE 0.9 % (FLUSH) 0.9 %
3-10 SYRINGE (ML) INJECTION AS NEEDED
Status: DISCONTINUED | OUTPATIENT
Start: 2020-05-19 | End: 2020-05-19 | Stop reason: HOSPADM

## 2020-05-19 RX ORDER — HYDROMORPHONE HYDROCHLORIDE 1 MG/ML
0.5 INJECTION, SOLUTION INTRAMUSCULAR; INTRAVENOUS; SUBCUTANEOUS
Status: DISCONTINUED | OUTPATIENT
Start: 2020-05-19 | End: 2020-05-20 | Stop reason: HOSPADM

## 2020-05-19 RX ORDER — LABETALOL HYDROCHLORIDE 5 MG/ML
5 INJECTION, SOLUTION INTRAVENOUS
Status: DISCONTINUED | OUTPATIENT
Start: 2020-05-19 | End: 2020-05-19 | Stop reason: HOSPADM

## 2020-05-19 RX ORDER — PROMETHAZINE HYDROCHLORIDE 25 MG/ML
12.5 INJECTION, SOLUTION INTRAMUSCULAR; INTRAVENOUS ONCE AS NEEDED
Status: DISCONTINUED | OUTPATIENT
Start: 2020-05-19 | End: 2020-05-19 | Stop reason: HOSPADM

## 2020-05-19 RX ORDER — LIDOCAINE HYDROCHLORIDE 10 MG/ML
0.5 INJECTION, SOLUTION EPIDURAL; INFILTRATION; INTRACAUDAL; PERINEURAL ONCE AS NEEDED
Status: DISCONTINUED | OUTPATIENT
Start: 2020-05-19 | End: 2020-05-19 | Stop reason: HOSPADM

## 2020-05-19 RX ORDER — DEXAMETHASONE SODIUM PHOSPHATE 10 MG/ML
INJECTION INTRAMUSCULAR; INTRAVENOUS AS NEEDED
Status: DISCONTINUED | OUTPATIENT
Start: 2020-05-19 | End: 2020-05-19 | Stop reason: SURG

## 2020-05-19 RX ORDER — PROMETHAZINE HYDROCHLORIDE 25 MG/ML
6.25 INJECTION, SOLUTION INTRAMUSCULAR; INTRAVENOUS
Status: DISCONTINUED | OUTPATIENT
Start: 2020-05-19 | End: 2020-05-19 | Stop reason: HOSPADM

## 2020-05-19 RX ORDER — ACETAMINOPHEN 325 MG/1
650 TABLET ORAL ONCE AS NEEDED
Status: DISCONTINUED | OUTPATIENT
Start: 2020-05-19 | End: 2020-05-19 | Stop reason: HOSPADM

## 2020-05-19 RX ORDER — PANTOPRAZOLE SODIUM 40 MG/1
40 TABLET, DELAYED RELEASE ORAL
Status: DISCONTINUED | OUTPATIENT
Start: 2020-05-20 | End: 2020-05-20 | Stop reason: HOSPADM

## 2020-05-19 RX ORDER — ROCURONIUM BROMIDE 10 MG/ML
INJECTION, SOLUTION INTRAVENOUS AS NEEDED
Status: DISCONTINUED | OUTPATIENT
Start: 2020-05-19 | End: 2020-05-19 | Stop reason: SURG

## 2020-05-19 RX ORDER — FAMOTIDINE 10 MG/ML
20 INJECTION, SOLUTION INTRAVENOUS ONCE
Status: COMPLETED | OUTPATIENT
Start: 2020-05-19 | End: 2020-05-19

## 2020-05-19 RX ORDER — ATORVASTATIN CALCIUM 20 MG/1
20 TABLET, FILM COATED ORAL DAILY
Status: DISCONTINUED | OUTPATIENT
Start: 2020-05-19 | End: 2020-05-20 | Stop reason: HOSPADM

## 2020-05-19 RX ORDER — BUPIVACAINE HYDROCHLORIDE 2.5 MG/ML
INJECTION, SOLUTION INFILTRATION; PERINEURAL AS NEEDED
Status: DISCONTINUED | OUTPATIENT
Start: 2020-05-19 | End: 2020-05-19 | Stop reason: HOSPADM

## 2020-05-19 RX ORDER — HYDROCODONE BITARTRATE AND ACETAMINOPHEN 7.5; 325 MG/1; MG/1
1 TABLET ORAL ONCE AS NEEDED
Status: DISCONTINUED | OUTPATIENT
Start: 2020-05-19 | End: 2020-05-19 | Stop reason: HOSPADM

## 2020-05-19 RX ORDER — ACETAMINOPHEN 650 MG/1
650 SUPPOSITORY RECTAL EVERY 4 HOURS PRN
Status: DISCONTINUED | OUTPATIENT
Start: 2020-05-19 | End: 2020-05-20 | Stop reason: HOSPADM

## 2020-05-19 RX ORDER — SUCCINYLCHOLINE CHLORIDE 20 MG/ML
INJECTION INTRAMUSCULAR; INTRAVENOUS AS NEEDED
Status: DISCONTINUED | OUTPATIENT
Start: 2020-05-19 | End: 2020-05-19 | Stop reason: SURG

## 2020-05-19 RX ORDER — HYDROCODONE BITARTRATE AND ACETAMINOPHEN 7.5; 325 MG/1; MG/1
1 TABLET ORAL EVERY 4 HOURS PRN
Status: DISCONTINUED | OUTPATIENT
Start: 2020-05-19 | End: 2020-05-20 | Stop reason: HOSPADM

## 2020-05-19 RX ORDER — ONDANSETRON 2 MG/ML
4 INJECTION INTRAMUSCULAR; INTRAVENOUS EVERY 6 HOURS PRN
Status: DISCONTINUED | OUTPATIENT
Start: 2020-05-19 | End: 2020-05-20 | Stop reason: HOSPADM

## 2020-05-19 RX ORDER — HYDROMORPHONE HCL 110MG/55ML
PATIENT CONTROLLED ANALGESIA SYRINGE INTRAVENOUS AS NEEDED
Status: DISCONTINUED | OUTPATIENT
Start: 2020-05-19 | End: 2020-05-19 | Stop reason: SURG

## 2020-05-19 RX ORDER — ONDANSETRON 2 MG/ML
INJECTION INTRAMUSCULAR; INTRAVENOUS AS NEEDED
Status: DISCONTINUED | OUTPATIENT
Start: 2020-05-19 | End: 2020-05-19 | Stop reason: SURG

## 2020-05-19 RX ORDER — DIPHENHYDRAMINE HYDROCHLORIDE 50 MG/ML
12.5 INJECTION INTRAMUSCULAR; INTRAVENOUS
Status: DISCONTINUED | OUTPATIENT
Start: 2020-05-19 | End: 2020-05-19 | Stop reason: HOSPADM

## 2020-05-19 RX ORDER — DILTIAZEM HYDROCHLORIDE 180 MG/1
180 CAPSULE, COATED, EXTENDED RELEASE ORAL DAILY
Status: DISCONTINUED | OUTPATIENT
Start: 2020-05-19 | End: 2020-05-20 | Stop reason: HOSPADM

## 2020-05-19 RX ORDER — MAGNESIUM HYDROXIDE 1200 MG/15ML
LIQUID ORAL AS NEEDED
Status: DISCONTINUED | OUTPATIENT
Start: 2020-05-19 | End: 2020-05-19 | Stop reason: HOSPADM

## 2020-05-19 RX ORDER — NALOXONE HCL 0.4 MG/ML
0.2 VIAL (ML) INJECTION AS NEEDED
Status: DISCONTINUED | OUTPATIENT
Start: 2020-05-19 | End: 2020-05-19 | Stop reason: HOSPADM

## 2020-05-19 RX ADMIN — FENTANYL CITRATE 100 MCG: 50 INJECTION INTRAMUSCULAR; INTRAVENOUS at 07:38

## 2020-05-19 RX ADMIN — SUCCINYLCHOLINE CHLORIDE 120 MG: 20 INJECTION, SOLUTION INTRAMUSCULAR; INTRAVENOUS; PARENTERAL at 07:38

## 2020-05-19 RX ADMIN — SODIUM CHLORIDE, POTASSIUM CHLORIDE, SODIUM LACTATE AND CALCIUM CHLORIDE 9 ML/HR: 600; 310; 30; 20 INJECTION, SOLUTION INTRAVENOUS at 07:20

## 2020-05-19 RX ADMIN — KETOROLAC TROMETHAMINE 15 MG: 30 INJECTION, SOLUTION INTRAMUSCULAR at 16:39

## 2020-05-19 RX ADMIN — ROCURONIUM BROMIDE 10 MG: 10 INJECTION, SOLUTION INTRAVENOUS at 08:28

## 2020-05-19 RX ADMIN — KETAMINE HYDROCHLORIDE 50 MG: 10 INJECTION INTRAMUSCULAR; INTRAVENOUS at 08:06

## 2020-05-19 RX ADMIN — FAMOTIDINE 20 MG: 10 INJECTION, SOLUTION INTRAVENOUS at 07:20

## 2020-05-19 RX ADMIN — FENTANYL CITRATE 50 MCG: 50 INJECTION, SOLUTION INTRAMUSCULAR; INTRAVENOUS at 11:39

## 2020-05-19 RX ADMIN — SODIUM CHLORIDE, POTASSIUM CHLORIDE, SODIUM LACTATE AND CALCIUM CHLORIDE: 600; 310; 30; 20 INJECTION, SOLUTION INTRAVENOUS at 07:30

## 2020-05-19 RX ADMIN — HYDROMORPHONE HYDROCHLORIDE 1 MG: 2 INJECTION, SOLUTION INTRAMUSCULAR; INTRAVENOUS; SUBCUTANEOUS at 09:48

## 2020-05-19 RX ADMIN — CEFAZOLIN SODIUM 2 G: 2 INJECTION, SOLUTION INTRAVENOUS at 16:39

## 2020-05-19 RX ADMIN — HYDROMORPHONE HYDROCHLORIDE 0.5 MG: 1 INJECTION, SOLUTION INTRAMUSCULAR; INTRAVENOUS; SUBCUTANEOUS at 11:23

## 2020-05-19 RX ADMIN — ONDANSETRON HYDROCHLORIDE 4 MG: 2 SOLUTION INTRAMUSCULAR; INTRAVENOUS at 07:45

## 2020-05-19 RX ADMIN — PROPOFOL 200 MG: 10 INJECTION, EMULSION INTRAVENOUS at 07:38

## 2020-05-19 RX ADMIN — ICOSAPENT ETHYL 2 G: 1000 CAPSULE ORAL at 18:12

## 2020-05-19 RX ADMIN — POTASSIUM CHLORIDE AND SODIUM CHLORIDE 100 ML/HR: 900; 150 INJECTION, SOLUTION INTRAVENOUS at 15:59

## 2020-05-19 RX ADMIN — CEFAZOLIN SODIUM 2 G: 1 INJECTION, SOLUTION INTRAVENOUS at 07:45

## 2020-05-19 RX ADMIN — HYDROMORPHONE HYDROCHLORIDE 0.5 MG: 1 INJECTION, SOLUTION INTRAMUSCULAR; INTRAVENOUS; SUBCUTANEOUS at 11:46

## 2020-05-19 RX ADMIN — DEXAMETHASONE SODIUM PHOSPHATE 4 MG: 10 INJECTION INTRAMUSCULAR; INTRAVENOUS at 07:45

## 2020-05-19 RX ADMIN — CEFAZOLIN SODIUM 2 G: 2 INJECTION, SOLUTION INTRAVENOUS at 21:08

## 2020-05-19 RX ADMIN — FENTANYL CITRATE 50 MCG: 50 INJECTION, SOLUTION INTRAMUSCULAR; INTRAVENOUS at 11:20

## 2020-05-19 RX ADMIN — ROCURONIUM BROMIDE 50 MG: 10 INJECTION, SOLUTION INTRAVENOUS at 07:45

## 2020-05-19 RX ADMIN — SUGAMMADEX 200 MG: 100 INJECTION, SOLUTION INTRAVENOUS at 09:46

## 2020-05-19 RX ADMIN — LIDOCAINE HYDROCHLORIDE 100 MG: 20 INJECTION, SOLUTION INFILTRATION; PERINEURAL at 07:38

## 2020-05-19 RX ADMIN — KETAMINE HYDROCHLORIDE 10 MG: 10 INJECTION INTRAMUSCULAR; INTRAVENOUS at 09:06

## 2020-05-19 RX ADMIN — ROCURONIUM BROMIDE 20 MG: 10 INJECTION, SOLUTION INTRAVENOUS at 09:29

## 2020-05-19 NOTE — PLAN OF CARE
VSS. Patient admitted from PACU after robotic lap. Prostatectomy. 5 lap sites with dermabond and a VERITO drain on the left draining serosanguinous fluid. Tolerating clear liquid diet no c/o of n/v. Possible discharge tomorrow with F/C.

## 2020-05-19 NOTE — ANESTHESIA PREPROCEDURE EVALUATION
Anesthesia Evaluation     Patient summary reviewed and Nursing notes reviewed   NPO Solid Status: > 8 hours  NPO Liquid Status: > 2 hours           Airway   Mallampati: II  TM distance: <3 FB  Neck ROM: full  Possible difficult intubation and Anterior  Dental          Pulmonary - normal exam   (-) not a smoker  Cardiovascular - normal exam  Exercise tolerance: good (4-7 METS)    ECG reviewed    (+) hypertension, hyperlipidemia,   (-) angina      Neuro/Psych  GI/Hepatic/Renal/Endo    (+)  GERD,  renal disease (Cr 1.14) CRI, diabetes mellitus type 2 poorly controlled,     Musculoskeletal     Abdominal  - normal exam   Substance History      OB/GYN          Other   arthritis,    history of cancer                    Anesthesia Plan    ASA 2     general   (I have reviewed the patient's history with the patient and the chart, including all pertinent laboratory results and imaging. I have explained the risks of anesthesia including but not limited to dental damage, corneal abrasion, nerve injury, MI, stroke, and death.  )  intravenous induction     Anesthetic plan, all risks, benefits, and alternatives have been provided, discussed and informed consent has been obtained with: patient.

## 2020-05-19 NOTE — ANESTHESIA POSTPROCEDURE EVALUATION
"Patient: Emmanuel Roach    Procedure Summary     Date:  05/19/20 Room / Location:  [facility] OR [address] [place] [address] MAIN OR    Anesthesia Start:  0728 Anesthesia Stop:  1011    Procedure:  ROBOTIC LAPAROSCOPIC  PROSTATECTOMY (N/A Abdomen) Diagnosis:      Surgeon:  Pernell Jenkins MD Provider:  Shelyl Hicks MD    Anesthesia Type:  general ASA Status:  2          Anesthesia Type: general    Vitals  Vitals Value Taken Time   /66 5/19/2020 12:15 PM   Temp 36.5 °C (97.7 °F) 5/19/2020 10:06 AM   Pulse 80 5/19/2020 12:26 PM   Resp 14 5/19/2020 12:15 PM   SpO2 98 % 5/19/2020 12:26 PM   Vitals shown include unvalidated device data.        Post Anesthesia Care and Evaluation    Patient location during evaluation: bedside  Patient participation: complete - patient participated  Level of consciousness: awake and alert  Pain score: 0  Pain management: adequate  Airway patency: patent  Anesthetic complications: No anesthetic complications    Cardiovascular status: acceptable  Respiratory status: acceptable  Hydration status: acceptable    Comments: /66   Pulse 73   Temp 36.5 °C (97.7 °F) (Oral)   Resp 14   Ht 182.9 cm (72\")   Wt 84.2 kg (185 lb 10 oz)   SpO2 95%   BMI 25.18 kg/m²       "

## 2020-05-19 NOTE — H&P
FIRST UROLOGY CONSULT      Patient Identification:  NAME:  Emmanuel Roach  Age:  70 y.o.   Sex:  male   :  1950   MRN:  [id number]       Chief complaint: Prostate cancer.      History of present illness:  H/o CaP s/p HIFU. PSA recurrence. For salvage Robotic prostatectomy.        Past medical history:  Past Medical History:   Diagnosis Date   • Arthritis    • Diverticulitis    • GERD (gastroesophageal reflux disease)    • Hx of migraines    • Hx of renal calculi    • Hyperlipidemia    • Hypertension    • Irregular bowel habits    • Prostate cancer (CMS/HCC)    • Type 2 diabetes mellitus (CMS/HCC)    • Vitamin D deficiency        Past surgical history:  Past Surgical History:   Procedure Laterality Date   • COLONOSCOPY     • ELBOW ARTHROPLASTY     • ENDOSCOPY     • HERNIA REPAIR     • PROSTATE SURGERY         Allergies:  Patient has no known allergies.    Home medications:  Medications Prior to Admission   Medication Sig Dispense Refill Last Dose   • Ascorbic Acid (VITAMIN C ER) 1000 MG tablet controlled-release Take 1 tablet by mouth Daily.   2020 at 0900   • atorvastatin (LIPITOR) 20 MG tablet Take 1 tablet by mouth Every Night. (Patient taking differently: Take 20 mg by mouth Daily.) 90 tablet 3 2020 at 0900   • Cholecalciferol (VITAMIN D-3) 125 MCG (5000 UT) tablet One tablet daily 90 tablet 1 2020 at 0900   • diclofenac (VOLTAREN) 75 MG EC tablet Take 75 mg by mouth 2 (Two) Times a Day.   2020 at 0900   • dilTIAZem CD (dilTIAZem CD) 180 MG 24 hr capsule Take 1 capsule by mouth Daily. 90 capsule 3 2020 at 0400   • losartan-hydrochlorothiazide (HYZAAR) 50-12.5 MG per tablet Take 1 tablet by mouth Daily. 90 tablet 1 2020 at 0900   • omeprazole (priLOSEC) 20 MG capsule Take 1 capsule by mouth Daily. 30 capsule 1 2020 at 0400   • TRULICITY 1.5 MG/0.5ML solution pen-injector Inject 1.5 mg under the skin into the appropriate area as directed 1 (One) Time Per Week. 12  pen 3 5/15/2020   • VASCEPA 1 g capsule capsule Take 2 g by mouth 2 (Two) Times a Day With Meals. 360 capsule 3 2020 at 0900   • XIGDUO XR 5-1000 MG tablet Take 2 tablets by mouth Daily. 180 tablet 3 2020 at 0900   • FREESTYLE LITE test strip Test 3 times daily 300 each 3    • Lancets (FREESTYLE) lancets Test 3 times daily 300 each 2    • pioglitazone (Actos) 30 MG tablet Take 1 tablet by mouth Daily. 90 tablet 3 2020 at 0900        Hospital medications:    ceFAZolin 1 g Intravenous Once   famotidine 20 mg Intravenous Once   sodium chloride 3 mL Intravenous Q12H       lactated ringers 9 mL/hr     fentanyl  •  lidocaine PF 1%  •  sodium chloride    Family history:  Family History   Problem Relation Age of Onset   • Diabetes Mother    • Malig Hyperthermia Neg Hx        Social history:  Social History     Tobacco Use   • Smoking status: Never Smoker   • Smokeless tobacco: Never Used   Substance Use Topics   • Alcohol use: Never     Frequency: Never   • Drug use: Never       Review of systems:      Positive for:  Prostate cancer.    Negative for:  Fever.      Objective:  TMax 24 hours:   Temp (24hrs), Av °F (36.7 °C), Min:98 °F (36.7 °C), Max:98 °F (36.7 °C)      Vitals Ranges:   Temp:  [98 °F (36.7 °C)] 98 °F (36.7 °C)  Heart Rate:  [98] 98  Resp:  [18] 18  BP: (157)/(83) 157/83    Intake/Output Last 3 shifts:  No intake/output data recorded.     Physical Exam:    General Appearance:    Alert, cooperative, NAD   HEENT:    No trauma, pupils reactive, hearing intact   Back:     No CVA tenderness   Lungs:     Respirations unlabored, no wheezing    Heart:    RRR.   Abdomen:     Soft, NDNT, no masses, no guarding   :       Extremities:   No edema, no deformity   Lymphatic:   No neck or groin LAD   Skin:   No bleeding, bruising or rashes   Neuro/Psych:   Orientation intact, mood/affect pleasant, no focal findings       Results review:   I reviewed the patient's new clinical results.    Data review:  Lab  Results (last 24 hours)     Procedure Component Value Units Date/Time    POC Glucose Once [[id number]]  (Abnormal) Collected:  05/19/20 0540    Specimen:  Blood Updated:  05/19/20 0543     Glucose 143 mg/dL            Imaging:  Imaging Results (Last 24 Hours)     ** No results found for the last 24 hours. **             Assessment:       Prostate cancer (CMS/HCC)    Prostate cancer.      Plan:     Salvage Robotic Prostatectomy.  R/B d/w pt.      Pernell Jenkins MD  05/19/20  07:19

## 2020-05-19 NOTE — ANESTHESIA PROCEDURE NOTES
Airway  Urgency: elective    Date/Time: 5/19/2020 7:39 AM    General Information and Staff    Patient location during procedure: OR  CRNA: Xiomara Caruso CRNA    Indications and Patient Condition  Indications for airway management: airway protection    Preoxygenated: yes  Mask difficulty assessment: 0 - not attempted    Final Airway Details  Final airway type: endotracheal airway      Successful airway: ETT  Cuffed: yes   Successful intubation technique: direct laryngoscopy  Facilitating devices/methods: anterior pressure/BURP  Endotracheal tube insertion site: oral  Blade: Tana  Blade size: 4  ETT size (mm): 7.5  Cormack-Lehane Classification: grade IIa - partial view of glottis  Placement verified by: chest auscultation and capnometry   Cuff volume (mL): 10  Measured from: lips  ETT/EBT  to lips (cm): 21  Number of attempts at approach: 1    Additional Comments  Atraumatic placement of ETT, dentition unchanged from preop.

## 2020-05-19 NOTE — OP NOTE
PREOPERATIVE DIAGNOSIS: N[place] 7 adenocarcinoma the prostate.    POSTOPERATIVE DIAGNOSIS: Same    PROCEDURE: Robotic prostatectomy    SURGEON:  Pernell Jenkins MD    ASSISTANT: Sushma Deshpande    ANESTHESIA: General    EBL: 100 cc    DRAINS: 20 [place] Island[place] Aguila catheter, 19 [place] Islander Jaiden drain.    COMPLICATIONS: None    FINDINGS: Prostate cancer    INDICATIONS FOR PROCEDURE: History of [place] 7 adenocarcinoma the prostate.  He had previously undergone a HIFU procedure.  He had a PSA recurrence his bone scan and CT scan were negative.  He presents today for salvage robotic prostatectomy.  Risk and benefits were explained include but not limited to bleeding, infection, impotence and incontinence.  Damage to adjacent vessels, structures and nerves were discussed.  Multiple procedures were discussed.  Patient consented to the procedure.    DESCRIPTION OF PROCEDURE: After receiving IV antibiotics he was taken to the operative suite placed in supine position the operating table.  He underwent a general anesthesia.  After adequate anesthesia was obtained he was placed in mild frog-leg position.  His abdomen and groin were prepped and draped in a sterile fashion.  An 18 [place] Islander coudé tip catheter was placed 10 cc were placed in the balloon Aguila bag was placed.  A 15 blade knife was used to make a 1 cm supraumbilical midline incision.  Towel clips were placed on either side of the incision and the abdominal wall was lifted anteriorly.  A Veress needle was placed into the abdomen and saline test was performed which was negative.  CO2 gas was turned on and pneumoperitoneum was achieved.  Using the Optiview I placed an 8 mm port to the abdomen.  There was no evidence of any vessel or bowel injuries.  No scarring or adhesions were noted.  I made 8 mm incisions 4 fingerbreadths lateral bilaterally and 4 fingerbreadths lateral to that bilaterally.  On the right I made a 12 mm incision for the assistant port.  Using visual guidance I  placed 8 mm ports 2 in the left one right medial and a again a 12 mm port was placed on the right assistant port.  The table was dropped he is placed in full Trendelenburg position the robot was docked.  Using cutting current I transected the bilateral eating umbilical ligaments.  I took down the space of Retzius without difficulty.  He did have some scarring on the right where the previous hernia incision.  I incised the bilateral endopelvic fascia sharply and bluntly.  I partially took down the pupil prosthetic ligaments.  I then ligated the dorsal vein complex with a 2-0 Vicryl.  This was done with 0 degree lens.  I then switched to a 30 degree down lens.  I incised the anterior plane between the prostate and the bladder with cautery.  I was able to identify the Aguila catheter and lifted against abdominal wall on traction.  The posterior plane was taken down and the sample of the vas were identified.  Transected each sample of the vas with cautery each seminal vesicle was dissected out and lifted anteriorly.  The right seminal vesicle was somewhat stuck in.  I then placed hemoclips on the bilateral prostatic pedicles.  I did not do a nerve sparing because of the recurrence of disease.  I did a wide margin with cautery.  This was done bilaterally to the urethra.  I then transected the dorsal and complex with cautery.  Scissors were used to transect the urethra.  This area is very stuck down.  I was able to get a plain and take down the posterior urethra after the catheter was removed.  There was one area that was very stuck down to the rectal wall and I had to use scissors and very fine dissection to release it.  The prostate was then released and placed in an Endo Catch bag in the abdomen.  I placed the index finger into the rectum and there was no evidence of rectal injury.  I then began the anastomosis of the bladder and the urethra using two 3-0 Monocryl 7 sutures tied together in a running fashion.  Prior to  the final throw a new 20 [place] Aguila catheter was placed 10 cc were placed in the balloon.  The final throat was then sewn down.  The Aguila cath was then irrigated with Leonel syringe.  There is no evidence of leak or clots.  Tisseel was placed over the anastomosis.  A 19 [place]olan Jaiden drain was placed through the fourth arm and sewn in with a 2-0 silk.    Robot was undocked gas was turned off the patient was placed back in flat positions the ports were removed.  The supraumbilical midline incision was increased to 2 cm with cautery.  I removed the prostate Endo Catch bag intact and passed off table specimen.  The supraumbilical fascial incision was then closed using an 0 Vicryl running suture.  All skin incisions were closed with 4-0 Monocryl subcu running suture.  Quarter percent Marcaine without epinephrine was used for local anesthetic.  Dermabond was placed.  Dressing sponge and paper tape were placed at the VERITO site.  He was then extubated taken recovery in satisfactory condition.  He tolerated the procedure well.  All instrument needle counts were correct.

## 2020-05-20 ENCOUNTER — READMISSION MANAGEMENT (OUTPATIENT)
Dept: CALL CENTER | Facility: HOSPITAL | Age: 70
End: 2020-05-20

## 2020-05-20 VITALS
BODY MASS INDEX: 25.14 KG/M2 | HEIGHT: 72 IN | SYSTOLIC BLOOD PRESSURE: 114 MMHG | WEIGHT: 185.63 LBS | RESPIRATION RATE: 18 BRPM | DIASTOLIC BLOOD PRESSURE: 61 MMHG | TEMPERATURE: 98.2 F | HEART RATE: 91 BPM | OXYGEN SATURATION: 93 %

## 2020-05-20 LAB
ANION GAP SERPL CALCULATED.3IONS-SCNC: 12.8 MMOL/L (ref 5–15)
BUN BLD-MCNC: 18 MG/DL (ref 8–23)
BUN/CREAT SERPL: 17.3 (ref 7–25)
CALCIUM SPEC-SCNC: 8.7 MG/DL (ref 8.6–10.5)
CHLORIDE SERPL-SCNC: 103 MMOL/L (ref 98–107)
CO2 SERPL-SCNC: 24.2 MMOL/L (ref 22–29)
CREAT BLD-MCNC: 1.04 MG/DL (ref 0.76–1.27)
DEPRECATED RDW RBC AUTO: 45.5 FL (ref 37–54)
ERYTHROCYTE [DISTWIDTH] IN BLOOD BY AUTOMATED COUNT: 12.6 % (ref 12.3–15.4)
GFR SERPL CREATININE-BSD FRML MDRD: 71 ML/MIN/1.73
GLUCOSE BLD-MCNC: 131 MG/DL (ref 65–99)
HCT VFR BLD AUTO: 37.2 % (ref 37.5–51)
HGB BLD-MCNC: 13.2 G/DL (ref 13–17.7)
MCH RBC QN AUTO: 34.6 PG (ref 26.6–33)
MCHC RBC AUTO-ENTMCNC: 35.5 G/DL (ref 31.5–35.7)
MCV RBC AUTO: 97.6 FL (ref 79–97)
PLATELET # BLD AUTO: 148 10*3/MM3 (ref 140–450)
PMV BLD AUTO: 12 FL (ref 6–12)
POTASSIUM BLD-SCNC: 4.6 MMOL/L (ref 3.5–5.2)
RBC # BLD AUTO: 3.81 10*6/MM3 (ref 4.14–5.8)
SODIUM BLD-SCNC: 140 MMOL/L (ref 136–145)
WBC NRBC COR # BLD: 8.73 10*3/MM3 (ref 3.4–10.8)

## 2020-05-20 PROCEDURE — 25810000003 SODIUM CHLORIDE 0.9 % WITH KCL 20 MEQ 20-0.9 MEQ/L-% SOLUTION: Performed by: UROLOGY

## 2020-05-20 PROCEDURE — A9270 NON-COVERED ITEM OR SERVICE: HCPCS | Performed by: UROLOGY

## 2020-05-20 PROCEDURE — 63710000001 ATORVASTATIN 20 MG TABLET: Performed by: UROLOGY

## 2020-05-20 PROCEDURE — 63710000001 PIOGLITAZONE 30 MG TABLET: Performed by: UROLOGY

## 2020-05-20 PROCEDURE — 63710000001 PANTOPRAZOLE 40 MG TABLET DELAYED-RELEASE: Performed by: UROLOGY

## 2020-05-20 PROCEDURE — 63710000001: Performed by: UROLOGY

## 2020-05-20 PROCEDURE — 63710000001 LOSARTAN 50 MG TABLET 90 EACH BOTTLE: Performed by: UROLOGY

## 2020-05-20 PROCEDURE — 80048 BASIC METABOLIC PNL TOTAL CA: CPT | Performed by: UROLOGY

## 2020-05-20 PROCEDURE — 85027 COMPLETE CBC AUTOMATED: CPT | Performed by: UROLOGY

## 2020-05-20 PROCEDURE — 25010000002 ENOXAPARIN PER 10 MG: Performed by: UROLOGY

## 2020-05-20 PROCEDURE — 63710000001 HYDROCHLOROTHIAZIDE 12.5 MG CAPSULE 100 EACH BOTTLE: Performed by: UROLOGY

## 2020-05-20 PROCEDURE — G0378 HOSPITAL OBSERVATION PER HR: HCPCS

## 2020-05-20 PROCEDURE — 25010000002 KETOROLAC TROMETHAMINE PER 15 MG: Performed by: UROLOGY

## 2020-05-20 PROCEDURE — 63710000001 DILTIAZEM CD 180 MG CAPSULE SUSTAINED-RELEASE 24 HR: Performed by: UROLOGY

## 2020-05-20 RX ORDER — HYDROCODONE BITARTRATE AND ACETAMINOPHEN 5; 325 MG/1; MG/1
1 TABLET ORAL EVERY 6 HOURS PRN
Qty: 30 TABLET | Refills: 0 | Status: SHIPPED | OUTPATIENT
Start: 2020-05-20 | End: 2021-05-28

## 2020-05-20 RX ORDER — SULFAMETHOXAZOLE AND TRIMETHOPRIM 800; 160 MG/1; MG/1
1 TABLET ORAL 2 TIMES DAILY
Qty: 14 TABLET | Refills: 0 | Status: SHIPPED | OUTPATIENT
Start: 2020-05-20 | End: 2020-11-09

## 2020-05-20 RX ORDER — DOCUSATE SODIUM 100 MG/1
100 CAPSULE, LIQUID FILLED ORAL 2 TIMES DAILY
Qty: 30 CAPSULE | Refills: 1 | Status: SHIPPED | OUTPATIENT
Start: 2020-05-20 | End: 2022-10-28

## 2020-05-20 RX ADMIN — KETOROLAC TROMETHAMINE 15 MG: 30 INJECTION, SOLUTION INTRAMUSCULAR at 08:42

## 2020-05-20 RX ADMIN — POTASSIUM CHLORIDE AND SODIUM CHLORIDE 100 ML/HR: 900; 150 INJECTION, SOLUTION INTRAVENOUS at 02:55

## 2020-05-20 RX ADMIN — PIOGLITAZONE 30 MG: 30 TABLET ORAL at 08:32

## 2020-05-20 RX ADMIN — ICOSAPENT ETHYL 2 G: 1000 CAPSULE ORAL at 08:32

## 2020-05-20 RX ADMIN — LOSARTAN POTASSIUM: 50 TABLET, FILM COATED ORAL at 08:32

## 2020-05-20 RX ADMIN — DILTIAZEM HYDROCHLORIDE 180 MG: 180 CAPSULE, COATED, EXTENDED RELEASE ORAL at 08:32

## 2020-05-20 RX ADMIN — ATORVASTATIN CALCIUM 20 MG: 20 TABLET, FILM COATED ORAL at 08:32

## 2020-05-20 RX ADMIN — ENOXAPARIN SODIUM 40 MG: 40 INJECTION SUBCUTANEOUS at 08:31

## 2020-05-20 RX ADMIN — PANTOPRAZOLE SODIUM 40 MG: 40 TABLET, DELAYED RELEASE ORAL at 06:40

## 2020-05-20 NOTE — PROGRESS NOTES
Case Management Discharge Note      Final Note: Home; no needs identified.  RN to teach care for leg bag.  LYDIA Mclean    Provided Post Acute Provider List?: Yes  Post Acute Provider List: Home Health, Nursing Home  Provided Post Acute Provider Quality & Resource List?: N/A  N/A Quality & Resource List Comment: . The patient was provided with a HH/SNF list and not a print out of the HH/nursing home compare list from Medicare.gov as he currently denies any d/c needs.  Delivered To: Patient  Method of Delivery: In person    Destination      No service has been selected for the patient.      Durable Medical Equipment      No service has been selected for the patient.      Dialysis/Infusion      No service has been selected for the patient.      Home Medical Care      No service has been selected for the patient.      Therapy      No service has been selected for the patient.      Community Resources      No service has been selected for the patient.        Transportation Services  Private: Car    Final Discharge Disposition Code: 01 - home or self-care

## 2020-05-20 NOTE — PROGRESS NOTES
Discharge Planning Assessment  [facility]     Patient Name: Emmanuel Roach  MRN: [id number]  Today's Date: 5/20/2020    Admit Date: 5/19/2020    Discharge Needs Assessment     Row Name 05/20/20 1321       Living Environment    Lives With  spouse    Name(s) of Who Lives With Patient  spouse Svetlana Roach [phone]    Current Living Arrangements  home/apartment/condo    Primary Care Provided by  self;spouse/significant other    Provides Primary Care For  no one, unable/limited ability to care for self    Family Caregiver if Needed  spouse    Family Caregiver Names  spouse Svetlana Roach [phone]    Quality of Family Relationships  helpful;involved;supportive    Able to Return to Prior Arrangements  yes       Resource/Environmental Concerns    Resource/Environmental Concerns  home accessibility    Home Accessibility Concerns  stairs to enter home;stairs to access bedroom or bathroom 3 steps with 1 handrail to enter the 2 story home with 13 steps and 1 handrail to get to the bedrooms in the home       Transition Planning    Patient/Family Anticipates Transition to  home with family    Patient/Family Anticipated Services at Transition  none    Transportation Anticipated  family or friend will provide       Discharge Needs Assessment    Concerns to be Addressed  denies needs/concerns at this time    Equipment Currently Used at Home  none    Anticipated Changes Related to Illness  none    Equipment Needed After Discharge  none    Current Discharge Risk  physical impairment        Discharge Plan     Row Name 05/20/20 1320       Plan    Plan  Plans home; denies needs.     Provided Post Acute Provider List?  Yes    Post Acute Provider List  Home Health;Nursing Home    Provided Post Acute Provider Quality & Resource List?  N/A    N/A Quality & Resource List Comment  . The patient was provided with a HH/SNF list and not a print out of the HH/nursing home compare list from Medicare.gov as  he currently denies any d/c needs.    Delivered To  Patient    Method of Delivery  In person    Patient/Family in Agreement with Plan  yes    Plan Comments  Met with the patient at bedside; explained role of CCP, discussed [facility] notice (copy provided to patient, copy placed in patient's chart and copy placed in HIM basket) and discussed discharge planning needs.  The patient plans to return home with assistance from his spouse Svetlana Roach [phone].  He uses no DME, has 3 steps with 1 handrail to enter the 2 story home with 13 steps and 1 handrail to get to the bedrooms in the home. The patient's PCP is Javy Tinoco, pharmacy is [facility] [address], local pharmacy is [facility] on [facility] and he is agreeable to using [facility].  The patient denies any trouble remembering to take his medication or with affording his medication.  The patient denies any HH/SNF history, denies any POA documents, states that although he drives himself to his appointments his wife will transport him home upon d/c. The patient was provided with a HH/SNF list and not a print out of the HH/nursing home compare list from Medicare.gov as he currently denies any d/c needs.  CCP will follow to assist with any needs that may arise.  LYDIA Mclean        Destination      Coordination has not been started for this encounter.      Durable Medical Equipment      Coordination has not been started for this encounter.      Dialysis/Infusion      Coordination has not been started for this encounter.      Home Medical Care      Coordination has not been started for this encounter.      Therapy      Coordination has not been started for this encounter.      Community Resources      Coordination has not been started for this encounter.        Expected Discharge Date and Time     Expected Discharge Date Expected Discharge Time    May 20, 2020         Demographic Summary     Row Name 05/20/20 1321       General Information     Admission Type  observation    Arrived From  home    Required Notices Provided  Observation Status Notice    Referral Source  admission list    Reason for Consult  discharge planning    Preferred Language  English     Used During This Interaction  no        Functional Status     Row Name 05/20/20 1321       Functional Status    Usual Activity Tolerance  good    Current Activity Tolerance  moderate       Functional Status, IADL    Medications  independent    Meal Preparation  independent    Housekeeping  independent    Laundry  independent    Shopping  independent       Mental Status    General Appearance WDL  WDL       Mental Status Summary    Recent Changes in Mental Status/Cognitive Functioning  no changes        Psychosocial    No documentation.       Abuse/Neglect    No documentation.       Legal    No documentation.       Substance Abuse    No documentation.       Patient Forms     Row Name 05/20/20 1321       Patient Forms    Provider Choice List  Delivered    Delivered to  Patient    Method of delivery  In person    Patient Observation Letter  Delivered    Delivered to  Patient    Method of delivery  In person            LYDIA Gonzales

## 2020-05-20 NOTE — PLAN OF CARE
Problem: Patient Care Overview  Goal: Plan of Care Review  Outcome: Ongoing (interventions implemented as appropriate)  Flowsheets  Taken 5/20/2020 0449  Progress: improving  Outcome Summary: VSS.  A&Ox4.  Up with asst.  S/P robotic lap prostatectomy.  Lap x 5 with dermabond.  L VERITO.  Toradol for pain.  Likely d/c today.  Cont to monitor.  Taken 5/19/2020 1959  Plan of Care Reviewed With: patient

## 2020-05-20 NOTE — PROGRESS NOTES
AFVSS  POD#1  No complaints.  Good urine output.    Abd soft, incisions C/D/I.    A/p -s/p salvage Robotic Prostatectomy.  Doing well.  D/c VERITO drain.  Leg bag.  D/c to home.

## 2020-05-21 NOTE — OUTREACH NOTE
Prep Survey      Responses   [facility] patient discharged from?  [facility]   Is LACE score < 7 ?  Yes   Eligibility  Readm Mgmt   Discharge diagnosis  s/p robotic lap prostatectomy   COVID-19 Test Status  Negative   Does the patient have one of the following disease processes/diagnoses(primary or secondary)?  General Surgery   Does the patient have Home health ordered?  No   Is there a DME ordered?  No   Prep survey completed?  Yes          Lisandra Swanson RN

## 2020-05-22 LAB
CYTO UR: NORMAL
LAB AP CASE REPORT: NORMAL
LAB AP INTRADEPARTMENTAL CONSULT: NORMAL
LAB AP SPECIAL STAINS: NORMAL
LAB AP SYNOPTIC CHECKLIST: NORMAL
PATH REPORT.FINAL DX SPEC: NORMAL
PATH REPORT.GROSS SPEC: NORMAL

## 2020-05-29 ENCOUNTER — OFFICE VISIT (OUTPATIENT)
Dept: ENDOCRINOLOGY | Age: 70
End: 2020-05-29

## 2020-05-29 DIAGNOSIS — E55.9 VITAMIN D DEFICIENCY: ICD-10-CM

## 2020-05-29 DIAGNOSIS — IMO0002 DIABETES MELLITUS TYPE 2, UNCONTROLLED, WITH COMPLICATIONS: ICD-10-CM

## 2020-05-29 DIAGNOSIS — E78.2 MIXED HYPERLIPIDEMIA: ICD-10-CM

## 2020-05-29 DIAGNOSIS — I10 ESSENTIAL HYPERTENSION: Primary | ICD-10-CM

## 2020-05-29 PROCEDURE — 99442 PR PHYS/QHP TELEPHONE EVALUATION 11-20 MIN: CPT | Performed by: INTERNAL MEDICINE

## 2020-06-03 RX ORDER — DULAGLUTIDE 1.5 MG/.5ML
1.5 INJECTION, SOLUTION SUBCUTANEOUS WEEKLY
Qty: 12 PEN | Refills: 3 | Status: SHIPPED | OUTPATIENT
Start: 2020-06-03 | End: 2020-10-06 | Stop reason: SDUPTHER

## 2020-06-03 RX ORDER — ICOSAPENT ETHYL 1000 MG/1
2 CAPSULE ORAL 2 TIMES DAILY WITH MEALS
Qty: 360 CAPSULE | Refills: 3 | Status: SHIPPED | OUTPATIENT
Start: 2020-06-03 | End: 2020-06-03 | Stop reason: SDUPTHER

## 2020-06-03 RX ORDER — ICOSAPENT ETHYL 1000 MG/1
2 CAPSULE ORAL 2 TIMES DAILY WITH MEALS
Qty: 360 CAPSULE | Refills: 3 | Status: SHIPPED | OUTPATIENT
Start: 2020-06-03 | End: 2020-06-08

## 2020-06-03 RX ORDER — ATORVASTATIN CALCIUM 20 MG/1
20 TABLET, FILM COATED ORAL NIGHTLY
Qty: 90 TABLET | Refills: 3 | Status: SHIPPED | OUTPATIENT
Start: 2020-06-03 | End: 2021-03-26 | Stop reason: SDUPTHER

## 2020-06-03 RX ORDER — EMPAGLIFLOZIN AND METFORMIN HYDROCHLORIDE 12.5; 1 MG/1; MG/1
1 TABLET ORAL 2 TIMES DAILY
Qty: 180 TABLET | Refills: 3 | Status: SHIPPED | OUTPATIENT
Start: 2020-06-03 | End: 2020-11-09

## 2020-06-03 RX ORDER — PIOGLITAZONEHYDROCHLORIDE 30 MG/1
30 TABLET ORAL DAILY
Qty: 90 TABLET | Refills: 3 | Status: SHIPPED | OUTPATIENT
Start: 2020-06-03 | End: 2021-03-26 | Stop reason: SDUPTHER

## 2020-06-06 DIAGNOSIS — E78.2 MIXED HYPERLIPIDEMIA: ICD-10-CM

## 2020-06-08 RX ORDER — ICOSAPENT ETHYL 1000 MG/1
CAPSULE ORAL
Qty: 360 CAPSULE | Refills: 3 | Status: SHIPPED | OUTPATIENT
Start: 2020-06-08 | End: 2021-05-28 | Stop reason: SDUPTHER

## 2020-06-10 RX ORDER — DAPAGLIFLOZIN AND METFORMIN HYDROCHLORIDE 5; 1000 MG/1; MG/1
TABLET, FILM COATED, EXTENDED RELEASE ORAL
Qty: 180 TABLET | Refills: 3 | Status: SHIPPED | OUTPATIENT
Start: 2020-06-10 | End: 2021-05-28 | Stop reason: SDUPTHER

## 2020-09-21 ENCOUNTER — RESULTS ENCOUNTER (OUTPATIENT)
Dept: ENDOCRINOLOGY | Age: 70
End: 2020-09-21

## 2020-09-21 DIAGNOSIS — IMO0002 DIABETES MELLITUS TYPE 2, UNCONTROLLED, WITH COMPLICATIONS: ICD-10-CM

## 2020-09-21 DIAGNOSIS — E55.9 VITAMIN D DEFICIENCY: ICD-10-CM

## 2020-09-21 DIAGNOSIS — IMO0002 DIABETES MELLITUS TYPE 2, UNCONTROLLED, WITH COMPLICATIONS: Primary | ICD-10-CM

## 2020-09-21 DIAGNOSIS — E78.2 MIXED HYPERLIPIDEMIA: ICD-10-CM

## 2020-09-23 ENCOUNTER — RESULTS ENCOUNTER (OUTPATIENT)
Dept: ENDOCRINOLOGY | Age: 70
End: 2020-09-23

## 2020-09-23 DIAGNOSIS — I10 ESSENTIAL HYPERTENSION: ICD-10-CM

## 2020-09-23 DIAGNOSIS — E67.3 HYPERVITAMINOSIS D: ICD-10-CM

## 2020-09-23 DIAGNOSIS — IMO0002 DIABETES MELLITUS TYPE 2, UNCONTROLLED, WITH COMPLICATIONS: ICD-10-CM

## 2020-09-23 DIAGNOSIS — E78.2 MIXED HYPERLIPIDEMIA: ICD-10-CM

## 2020-09-23 DIAGNOSIS — E55.9 VITAMIN D DEFICIENCY: ICD-10-CM

## 2020-09-26 LAB
25(OH)D3+25(OH)D2 SERPL-MCNC: 52.6 NG/ML (ref 30–100)
ALBUMIN SERPL-MCNC: 4.7 G/DL (ref 3.5–5.2)
ALBUMIN/CREAT UR: 58 MG/G CREAT (ref 0–29)
ALBUMIN/GLOB SERPL: 2.1 G/DL
ALP SERPL-CCNC: 87 U/L (ref 39–117)
ALT SERPL-CCNC: 21 U/L (ref 1–41)
AST SERPL-CCNC: 15 U/L (ref 1–40)
BILIRUB SERPL-MCNC: 0.5 MG/DL (ref 0–1.2)
BUN SERPL-MCNC: 40 MG/DL (ref 8–23)
BUN/CREAT SERPL: 28.6 (ref 7–25)
CALCIUM SERPL-MCNC: 9.5 MG/DL (ref 8.6–10.5)
CHLORIDE SERPL-SCNC: 99 MMOL/L (ref 98–107)
CHOLEST SERPL-MCNC: 134 MG/DL (ref 0–200)
CO2 SERPL-SCNC: 25.5 MMOL/L (ref 22–29)
CREAT SERPL-MCNC: 1.4 MG/DL (ref 0.76–1.27)
CREAT UR-MCNC: 101.5 MG/DL
GLOBULIN SER CALC-MCNC: 2.2 GM/DL
GLUCOSE SERPL-MCNC: 175 MG/DL (ref 65–99)
HBA1C MFR BLD: 7.9 % (ref 4.8–5.6)
HDLC SERPL-MCNC: 53 MG/DL (ref 40–60)
INTERPRETATION: NORMAL
LDLC SERPL CALC-MCNC: 63 MG/DL (ref 0–100)
Lab: NORMAL
MICROALBUMIN UR-MCNC: 58.4 UG/ML
POTASSIUM SERPL-SCNC: 4.2 MMOL/L (ref 3.5–5.2)
PROT SERPL-MCNC: 6.9 G/DL (ref 6–8.5)
SODIUM SERPL-SCNC: 138 MMOL/L (ref 136–145)
T4 FREE SERPL-MCNC: 1.81 NG/DL (ref 0.93–1.7)
TRIGL SERPL-MCNC: 90 MG/DL (ref 0–150)
TSH SERPL DL<=0.005 MIU/L-ACNC: 1.55 UIU/ML (ref 0.27–4.2)
VLDLC SERPL CALC-MCNC: 18 MG/DL

## 2020-10-06 DIAGNOSIS — IMO0002 DIABETES MELLITUS TYPE 2, UNCONTROLLED, WITH COMPLICATIONS: ICD-10-CM

## 2020-10-06 RX ORDER — DULAGLUTIDE 1.5 MG/.5ML
1.5 INJECTION, SOLUTION SUBCUTANEOUS WEEKLY
Qty: 12 PEN | Refills: 3 | Status: SHIPPED | OUTPATIENT
Start: 2020-10-06 | End: 2021-05-28 | Stop reason: SDUPTHER

## 2020-11-09 ENCOUNTER — OFFICE VISIT (OUTPATIENT)
Dept: ENDOCRINOLOGY | Age: 70
End: 2020-11-09

## 2020-11-09 VITALS — BODY MASS INDEX: 27.09 KG/M2 | WEIGHT: 200 LBS | HEIGHT: 72 IN

## 2020-11-09 DIAGNOSIS — E11.9 TYPE 2 DIABETES MELLITUS WITHOUT COMPLICATION, WITHOUT LONG-TERM CURRENT USE OF INSULIN (HCC): Primary | ICD-10-CM

## 2020-11-09 PROCEDURE — 99442 PR PHYS/QHP TELEPHONE EVALUATION 11-20 MIN: CPT | Performed by: NURSE PRACTITIONER

## 2020-11-09 RX ORDER — DILTIAZEM HYDROCHLORIDE 180 MG/1
180 CAPSULE, EXTENDED RELEASE ORAL DAILY
COMMUNITY
Start: 2020-10-04 | End: 2021-05-28

## 2020-11-09 RX ORDER — NATEGLINIDE 120 MG/1
120 TABLET ORAL 3 TIMES DAILY
COMMUNITY
Start: 2020-10-24 | End: 2021-05-28 | Stop reason: SDUPTHER

## 2020-11-09 NOTE — PROGRESS NOTES
70 y.o.male    Patient Care Team:  Javy Tinoco DO as PCP - General (Family Medicine)    Chief Complaint:  dm2 f/u    You have chosen to receive care through a telephone visit. Do you consent to use a telephone visit for your medical care today? yes      Subjective   Doing well overall. Retired from nursing recently.    HPI    Emmanuel Roach 70 y.o. presents with Type 2 dm as a F/u patient.     Type 2 dm - Diagnosed about 10 years ago.   Today in clinic pt reports being on trulicity, xigduo, starlix, Actos  Checks BG - daily, 120-150  Dm retinopathy - denies ,Last eye exam - 3 months ago  Dm nephropathy - denies  Dm neuropathy - very mild    CAD - denies  CVA - denies  Episodes of hypoglycemia - denies  Pt is not physically active. weight is up about 15 pounds.   Pt does not follow DM diet for most part.   On ARB    Mixed hyperlipidemia, chroni, stable on vascepa and atorvastatin tolerating well.        Interval History      The following portions of the patient's history were reviewed and updated as appropriate: allergies, current medications, past family history, past medical history, past social history, past surgical history and problem list.    Past Medical History:   Diagnosis Date   • Arthritis    • Diverticulitis    • GERD (gastroesophageal reflux disease)    • Hx of migraines    • Hx of renal calculi    • Hyperlipidemia    • Hypertension    • Irregular bowel habits    • Prostate cancer (CMS/HCC)    • Type 2 diabetes mellitus (CMS/HCC)    • Vitamin D deficiency      Family History   Problem Relation Age of Onset   • Diabetes Mother    • Malig Hyperthermia Neg Hx      Social History     Socioeconomic History   • Marital status:      Spouse name: Not on file   • Number of children: Not on file   • Years of education: Not on file   • Highest education level: Not on file   Tobacco Use   • Smoking status: Never Smoker   • Smokeless tobacco: Never Used   Substance and Sexual Activity   • Alcohol use:  Never     Frequency: Never   • Drug use: Never   • Sexual activity: Defer     No Known Allergies    Current Outpatient Medications:   •  Ascorbic Acid (VITAMIN C ER) 1000 MG tablet controlled-release, Take 1 tablet by mouth Daily., Disp: , Rfl:   •  atorvastatin (LIPITOR) 20 MG tablet, Take 1 tablet by mouth Every Night., Disp: 90 tablet, Rfl: 3  •  Cholecalciferol (VITAMIN D-3) 125 MCG (5000 UT) tablet, One tablet daily, Disp: 90 tablet, Rfl: 1  •  diclofenac (VOLTAREN) 75 MG EC tablet, Take 75 mg by mouth 2 (Two) Times a Day., Disp: , Rfl:   •  dilTIAZem CD (dilTIAZem CD) 180 MG 24 hr capsule, Take 1 capsule by mouth Daily., Disp: 90 capsule, Rfl: 3  •  docusate sodium (Colace) 100 MG capsule, Take 1 capsule by mouth 2 (Two) Times a Day., Disp: 30 capsule, Rfl: 1  •  FREESTYLE LITE test strip, Test 3 times daily, Disp: 300 each, Rfl: 3  •  HYDROcodone-acetaminophen (Norco) 5-325 MG per tablet, Take 1 tablet by mouth Every 6 (Six) Hours As Needed for Moderate Pain ., Disp: 30 tablet, Rfl: 0  •  Lancets (FREESTYLE) lancets, Test 3 times daily, Disp: 300 each, Rfl: 2  •  losartan-hydrochlorothiazide (HYZAAR) 50-12.5 MG per tablet, Take 1 tablet by mouth Daily., Disp: 90 tablet, Rfl: 1  •  nateglinide (STARLIX) 120 MG tablet, Take 120 mg by mouth 3 (Three) Times a Day., Disp: , Rfl:   •  omeprazole (priLOSEC) 20 MG capsule, Take 1 capsule by mouth Daily., Disp: 30 capsule, Rfl: 1  •  pioglitazone (Actos) 30 MG tablet, Take 1 tablet by mouth Daily., Disp: 90 tablet, Rfl: 3  •  Tiazac 180 MG 24 hr capsule, Take 180 mg by mouth Daily., Disp: , Rfl:   •  Trulicity 1.5 MG/0.5ML solution pen-injector, Inject 1.5 mg under the skin into the appropriate area as directed 1 (One) Time Per Week., Disp: 12 pen, Rfl: 3  •  VASCEPA 1 g capsule capsule, TAKE 2 CAPSULES TWICE A DAY WITH MEALS, Disp: 360 capsule, Rfl: 3  •  XIGDUO XR 5-1000 MG tablet, TAKE 1 TABLET TWICE A DAY, Disp: 180 tablet, Rfl: 3        Review of Systems  "  Respiratory: Negative for cough and shortness of breath.    Cardiovascular: Negative for chest pain and leg swelling.   Endocrine: Negative for polydipsia, polyphagia and polyuria.   Genitourinary: Negative for dysuria and urgency.         Objective       Vitals:    11/09/20 1436   Weight: 90.7 kg (200 lb)   Height: 182.9 cm (72\")     Body mass index is 27.12 kg/m².    Phone interview  Physical Exam  Constitutional:       General: He is not in acute distress.  Pulmonary:      Effort: Pulmonary effort is normal. No respiratory distress.   Neurological:      Mental Status: He is alert and oriented to person, place, and time. Mental status is at baseline.   Psychiatric:         Mood and Affect: Mood normal.         Behavior: Behavior normal.         Thought Content: Thought content normal.         Judgment: Judgment normal.       Results Review:         Orders Only on 09/25/2020   Component Date Value Ref Range Status   • Glucose 09/25/2020 175* 65 - 99 mg/dL Final   • BUN 09/25/2020 40* 8 - 23 mg/dL Final   • Creatinine 09/25/2020 1.40* 0.76 - 1.27 mg/dL Final   • eGFR Non African Am 09/25/2020 50* >60 mL/min/1.73 Final   • eGFR African Am 09/25/2020 61  >60 mL/min/1.73 Final   • BUN/Creatinine Ratio 09/25/2020 28.6* 7.0 - 25.0 Final   • Sodium 09/25/2020 138  136 - 145 mmol/L Final   • Potassium 09/25/2020 4.2  3.5 - 5.2 mmol/L Final   • Chloride 09/25/2020 99  98 - 107 mmol/L Final   • Total CO2 09/25/2020 25.5  22.0 - 29.0 mmol/L Final   • Calcium 09/25/2020 9.5  8.6 - 10.5 mg/dL Final   • Total Protein 09/25/2020 6.9  6.0 - 8.5 g/dL Final   • Albumin 09/25/2020 4.70  3.50 - 5.20 g/dL Final   • Globulin 09/25/2020 2.2  gm/dL Final   • A/G Ratio 09/25/2020 2.1  g/dL Final   • Total Bilirubin 09/25/2020 0.5  0.0 - 1.2 mg/dL Final   • Alkaline Phosphatase 09/25/2020 87  39 - 117 U/L Final   • AST (SGOT) 09/25/2020 15  1 - 40 U/L Final   • ALT (SGPT) 09/25/2020 21  1 - 41 U/L Final   • Total Cholesterol 09/25/2020 " 134  0 - 200 mg/dL Final   • Triglycerides 09/25/2020 90  0 - 150 mg/dL Final   • HDL Cholesterol 09/25/2020 53  40 - 60 mg/dL Final   • VLDL Cholesterol Memo 09/25/2020 18  mg/dL Final   • LDL Chol Calc ([facility]) 09/25/2020 63  0 - 100 mg/dL Final   • Creatinine, Urine 09/25/2020 101.5  Not Estab. mg/dL Final   • Microalbumin, Urine 09/25/2020 58.4  Not Estab. ug/mL Final   • Microalbumin/Creatinine Ratio 09/25/2020 58* 0 - 29 mg/g creat Final    Comment:                        Normal:                0 -  29                         Moderately increased: 30 - 300                         Severely increased:       >300                **Please note reference interval change**     • Hemoglobin A1C 09/25/2020 7.90* 4.80 - 5.60 % Final    Comment: Hemoglobin A1C Ranges:  Increased Risk for Diabetes  5.7% to 6.4%  Diabetes                     >= 6.5%  Diabetic Goal                < 7.0%     • Free T4 09/25/2020 1.81* 0.93 - 1.70 ng/dL Final    Results may be falsely increased if patient taking Biotin.   • TSH 09/25/2020 1.550  0.270 - 4.200 uIU/mL Final   • 25 Hydroxy, Vitamin D 09/25/2020 52.6  30.0 - 100.0 ng/ml Final    Comment: Results may be falsely increased if patient taking Biotin.  Reference Range for Total Vitamin D 25(OH)  Deficiency <20.0 ng/mL  Insufficiency 21-29 ng/mL  Sufficiency  ng/mL  Toxicity >100 ng/ml     • Interpretation 09/25/2020 Note   Final    Supplemental report is available.   • PDF Image 09/25/2020 Not applicable   Final     Lab Results   Component Value Date    HGBA1C 7.90 (H) 09/25/2020    HGBA1C 7.90 (H) 03/20/2020    HGBA1C 7.70 (H) 09/12/2019     Lab Results   Component Value Date    MICROALBUR 58.4 09/25/2020    CREATININE 1.40 (H) 09/25/2020     Imaging Results (Most Recent)     None                Assessment and Plan:    Diagnoses and all orders for this visit:    1. Type 2 diabetes mellitus without complication, without long-term current use of insulin (CMS/[facility]) (Primary)  -      Hemoglobin A1c; Future  -     Comprehensive Metabolic Panel; Future  -     Lipid Panel; Future  -     Microalbumin / Creatinine Urine Ratio - Urine, Clean Catch; Future      a1c almost to target. Try to improve dietary choices and increase activity levels to help improve over all blood sugars. No medication changes needed today.    Low hypoglycemia risk    F/u with dr vanegas in 6 months.    Reviewed Lab results with the patient.     13 minutes spent with patient on the phone    EDVIN Sierra  11/09/20

## 2020-11-16 ENCOUNTER — RESULTS ENCOUNTER (OUTPATIENT)
Dept: ENDOCRINOLOGY | Age: 70
End: 2020-11-16

## 2020-11-16 DIAGNOSIS — I10 ESSENTIAL HYPERTENSION: ICD-10-CM

## 2020-11-16 DIAGNOSIS — E55.9 VITAMIN D DEFICIENCY: ICD-10-CM

## 2020-11-16 DIAGNOSIS — IMO0002 DIABETES MELLITUS TYPE 2, UNCONTROLLED, WITH COMPLICATIONS: ICD-10-CM

## 2020-11-16 DIAGNOSIS — E78.2 MIXED HYPERLIPIDEMIA: ICD-10-CM

## 2021-03-26 DIAGNOSIS — IMO0002 DIABETES MELLITUS TYPE 2, UNCONTROLLED, WITH COMPLICATIONS: ICD-10-CM

## 2021-03-26 DIAGNOSIS — E78.2 MIXED HYPERLIPIDEMIA: ICD-10-CM

## 2021-03-26 NOTE — TELEPHONE ENCOUNTER
ATORVASTATIN   OMEPRAZOLE   PIOGLIPAZONE    SEND TO [facility]AS[facility]   Complex Repair And Epidermal Autograft Text: The defect edges were debeveled with a #15 scalpel blade.  The primary defect was closed partially with a complex linear closure.  Given the location of the defect, shape of the defect and the proximity to free margins an epidermal autograft was deemed most appropriate to repair the remaining defect.  The graft was trimmed to fit the size of the remaining defect.  The graft was then placed in the primary defect, oriented appropriately, and sutured into place.

## 2021-03-29 RX ORDER — ATORVASTATIN CALCIUM 20 MG/1
20 TABLET, FILM COATED ORAL NIGHTLY
Qty: 90 TABLET | Refills: 0 | Status: SHIPPED | OUTPATIENT
Start: 2021-03-29 | End: 2021-11-29 | Stop reason: SDUPTHER

## 2021-03-29 RX ORDER — OMEPRAZOLE 20 MG/1
20 CAPSULE, DELAYED RELEASE ORAL DAILY
Qty: 30 CAPSULE | Refills: 0 | OUTPATIENT
Start: 2021-03-29

## 2021-03-29 RX ORDER — PIOGLITAZONEHYDROCHLORIDE 30 MG/1
30 TABLET ORAL DAILY
Qty: 90 TABLET | Refills: 0 | Status: SHIPPED | OUTPATIENT
Start: 2021-03-29 | End: 2021-05-28 | Stop reason: SDUPTHER

## 2021-05-28 ENCOUNTER — MEDICATION THERAPY MANAGEMENT (OUTPATIENT)
Dept: PHARMACY | Facility: HOSPITAL | Age: 71
End: 2021-05-28

## 2021-05-28 ENCOUNTER — OFFICE VISIT (OUTPATIENT)
Dept: ENDOCRINOLOGY | Age: 71
End: 2021-05-28

## 2021-05-28 VITALS
WEIGHT: 203.6 LBS | BODY MASS INDEX: 27.58 KG/M2 | DIASTOLIC BLOOD PRESSURE: 90 MMHG | HEIGHT: 72 IN | SYSTOLIC BLOOD PRESSURE: 134 MMHG

## 2021-05-28 DIAGNOSIS — E78.2 MIXED HYPERLIPIDEMIA: ICD-10-CM

## 2021-05-28 DIAGNOSIS — E11.65 TYPE 2 DIABETES MELLITUS WITH HYPERGLYCEMIA, WITHOUT LONG-TERM CURRENT USE OF INSULIN (HCC): Primary | ICD-10-CM

## 2021-05-28 PROCEDURE — 99214 OFFICE O/P EST MOD 30 MIN: CPT | Performed by: NURSE PRACTITIONER

## 2021-05-28 RX ORDER — LANCETS 28 GAUGE
EACH MISCELLANEOUS
Qty: 300 EACH | Refills: 2 | Status: SHIPPED | OUTPATIENT
Start: 2021-05-28 | End: 2023-01-23 | Stop reason: SDUPTHER

## 2021-05-28 RX ORDER — DAPAGLIFLOZIN AND METFORMIN HYDROCHLORIDE 5; 1000 MG/1; MG/1
1 TABLET, FILM COATED, EXTENDED RELEASE ORAL 2 TIMES DAILY
Qty: 180 TABLET | Refills: 3 | Status: SHIPPED | OUTPATIENT
Start: 2021-05-28 | End: 2021-11-29 | Stop reason: SDUPTHER

## 2021-05-28 RX ORDER — BLOOD-GLUCOSE METER
KIT MISCELLANEOUS
Qty: 300 EACH | Refills: 3 | Status: SHIPPED | OUTPATIENT
Start: 2021-05-28 | End: 2023-01-23 | Stop reason: SDUPTHER

## 2021-05-28 RX ORDER — NATEGLINIDE 120 MG/1
120 TABLET ORAL 3 TIMES DAILY
Qty: 270 TABLET | Refills: 1 | Status: SHIPPED | OUTPATIENT
Start: 2021-05-28 | End: 2021-11-29 | Stop reason: SDUPTHER

## 2021-05-28 RX ORDER — ICOSAPENT ETHYL 1000 MG/1
2 CAPSULE ORAL 2 TIMES DAILY WITH MEALS
Qty: 360 CAPSULE | Refills: 3 | Status: SHIPPED | OUTPATIENT
Start: 2021-05-28 | End: 2021-11-29 | Stop reason: SDUPTHER

## 2021-05-28 RX ORDER — PIOGLITAZONEHYDROCHLORIDE 30 MG/1
30 TABLET ORAL DAILY
Qty: 90 TABLET | Refills: 1 | Status: SHIPPED | OUTPATIENT
Start: 2021-05-28 | End: 2021-11-29 | Stop reason: SDUPTHER

## 2021-05-28 RX ORDER — DULAGLUTIDE 1.5 MG/.5ML
1.5 INJECTION, SOLUTION SUBCUTANEOUS WEEKLY
Qty: 12 PEN | Refills: 3 | Status: SHIPPED | OUTPATIENT
Start: 2021-05-28 | End: 2021-11-29 | Stop reason: SDUPTHER

## 2021-05-28 RX ORDER — DAPAGLIFLOZIN AND METFORMIN HYDROCHLORIDE 5; 1000 MG/1; MG/1
1 TABLET, FILM COATED, EXTENDED RELEASE ORAL 2 TIMES DAILY
Qty: 180 TABLET | Refills: 1 | Status: CANCELLED | OUTPATIENT
Start: 2021-05-28

## 2021-05-28 NOTE — PROGRESS NOTES
"Chief Complaint  Diabetes (type 2 diabetes, blood sugars running 130-160 )    Subjective          Emmanuel Roach presents to [facility] ENDOCRINOLOGY  History of Present Illness     Type 2 dm    Diagnosed about 10 years ago.   Today in clinic pt reports being on trulicity 1.5mg weekly, xigduo 5-1000mg BID, nateglinide 120mg TIDAC , Actos 30mg daily  Checks BG - daily, 130-150  Dm retinopathy - denies ,Last eye exam - UTD  Dm nephropathy - denies  Dm neuropathy - very mild    CAD - denies  CVA - denies  Episodes of hypoglycemia - denies  Pt is not physically active. weight is stable   Pt does not follow DM diet for most part.   On ARB  Lab Results   Component Value Date    HGBA1C 7.90 (H) 05/14/2021        HLP  Atorvastatin 20mg daily  Lab Results   Component Value Date    CHLPL 153 05/14/2021    TRIG 116 05/14/2021    HDL 47 05/14/2021    LDL 85 05/14/2021         Objective   Vital Signs:   /90   Ht 182.9 cm (72\")   Wt 92.4 kg (203 lb 9.6 oz)   BMI 27.61 kg/m²     Physical Exam  Vitals reviewed.   Constitutional:       General: He is not in acute distress.  HENT:      Head: Normocephalic and atraumatic.   Cardiovascular:      Rate and Rhythm: Normal rate and regular rhythm.   Pulmonary:      Effort: Pulmonary effort is normal. No respiratory distress.   Musculoskeletal:         General: No signs of injury. Normal range of motion.      Cervical back: Normal range of motion and neck supple.   Skin:     General: Skin is warm and dry.   Neurological:      Mental Status: He is alert and oriented to person, place, and time. Mental status is at baseline.   Psychiatric:         Mood and Affect: Mood normal.         Behavior: Behavior normal.         Thought Content: Thought content normal.         Judgment: Judgment normal.        Result Review :   The following data was reviewed by: EDVIN Sierra on 05/28/2021:  Common labs    Common Labsle 9/25/20 9/25/20 9/25/20 9/25/20 3/3/21 3/3/21 " 5/14/21 5/14/21 5/14/21 5/14/21    0900 0900 0900 0900 0922 0922 [id number]   Glucose 175 (A)     188 (A) 152 (A)      BUN 40 (A)     28 (A) 24 (A)      Creatinine 1.40 (A)     1.3 1.11      eGFR Non  Am 50 (A)      65      eGFR  Am 61      79      Sodium 138     140 145      Potassium 4.2     4.5 4.5      Chloride 99     100 108 (A)      Calcium 9.5     9.6 9.8      Total Protein 6.9      6.8      Albumin 4.70      4.50      Total Bilirubin 0.5      0.5      Alkaline Phosphatase 87      82      AST (SGOT) 15      19      ALT (SGPT) 21      26      Total Cholesterol  134      153     Triglycerides  90      116     HDL Cholesterol  53      47     LDL Cholesterol   63      85     Hemoglobin A1C    7.90 (A) 7.9 (A)     7.90 (A)   Microalbumin, Urine   58.4      7.1    (A) Abnormal value       Comments are available for some flowsheets but are not being displayed.                     Assessment and Plan    Diagnoses and all orders for this visit:    1. Type 2 diabetes mellitus with hyperglycemia, without long-term current use of insulin (CMS/[facility]) (Primary)  -     Trulicity 1.5 MG/0.5ML solution pen-injector; Inject 1.5 mg under the skin into the appropriate area as directed 1 (One) Time Per Week.  Dispense: 12 pen; Refill: 3  -     pioglitazone (Actos) 30 MG tablet; Take 1 tablet by mouth Daily.  Dispense: 90 tablet; Refill: 1  -     Lancets (freestyle) lancets; Test 3 times daily  Dispense: 300 each; Refill: 2  -     FREESTYLE LITE test strip; Test 3 times daily  Dispense: 300 each; Refill: 3  -     dapagliflozin-metformin HCl ER (Xigduo XR) 5-1000 MG tablet; Take 1 tablet by mouth 2 (Two) Times a Day.  Dispense: 180 tablet; Refill: 1  -     nateglinide (STARLIX) 120 MG tablet; Take 1 tablet by mouth 3 (Three) Times a Day.  Dispense: 270 tablet; Refill: 1    2. Mixed hyperlipidemia    Other orders  -     Cancel: Cholecalciferol (Vitamin D-3) 125 MCG (5000 UT) tablet; One tablet daily  Dispense:  90 tablet; Refill: 1        Follow Up   Return in about 6 months (around 11/28/2021).   Goal a1c <8%  Will add insulin if a1c worsens  For now continue current regimen with focus on improving diabetic diet and exercise  Daily diabetic foot care  Continue vascepa and statin  On arb   Had pharmacy education today as well   Hypoglycemia education     Patient was given instructions and counseling regarding his condition or for health maintenance advice. Please see specific information pulled into the AVS if appropriate.     Lola Edwards APRN

## 2021-05-28 NOTE — PROGRESS NOTES
MTM[facility]M Pharmacy Visit [facility] Endocrinology    Emmanuel Roach is a 71 y.o. male seen by Endocrinology and assessed by the Medication Management Clinic Pharmacist at [facility].  This was an initial MTM visit for Emmanuel Roach.    Emmanuel Roach was introduced to the [facility] and his allergies, PMH, and medications were reviewed.       Past Medical History:   Diagnosis Date   • Arthritis    • Diverticulitis    • GERD (gastroesophageal reflux disease)    • Hx of migraines    • Hx of renal calculi    • Hyperlipidemia    • Hypertension    • Irregular bowel habits    • Prostate cancer (CMS/HCC)    • Type 2 diabetes mellitus (CMS/HCC)    • Vitamin D deficiency      Social History     Socioeconomic History   • Marital status:      Spouse name: Not on file   • Number of children: Not on file   • Years of education: Not on file   • Highest education level: Not on file   Tobacco Use   • Smoking status: Never Smoker   • Smokeless tobacco: Never Used   Substance and Sexual Activity   • Alcohol use: Never   • Drug use: Never   • Sexual activity: Defer       Medication Allergies:  Patient has no known allergies.        Current Outpatient Medications:   •  Ascorbic Acid (VITAMIN C ER) 1000 MG tablet controlled-release, Take 1 tablet by mouth Daily., Disp: , Rfl:   •  atorvastatin (LIPITOR) 20 MG tablet, Take 1 tablet by mouth Every Night., Disp: 90 tablet, Rfl: 0  •  Cholecalciferol (VITAMIN D-3) 125 MCG (5000 UT) tablet, One tablet daily, Disp: 90 tablet, Rfl: 1  •  dapagliflozin-metformin HCl ER (Xigduo XR) 5-1000 MG tablet, Take 1 tablet by mouth 2 (Two) Times a Day., Disp: 180 tablet, Rfl: 3  •  diclofenac (VOLTAREN) 75 MG EC tablet, Take 75 mg by mouth 2 (Two) Times a Day., Disp: , Rfl:   •  dilTIAZem CD (dilTIAZem CD) 180 MG 24 hr capsule, Take 1 capsule by mouth Daily., Disp: 90 capsule, Rfl: 3  •  docusate sodium (Colace) 100 MG capsule, Take 1  capsule by mouth 2 (Two) Times a Day., Disp: 30 capsule, Rfl: 1  •  FREESTYLE LITE test strip, Test 3 times daily, Disp: 300 each, Rfl: 3  •  icosapent ethyl (Vascepa) 1 g capsule capsule, Take 2 g by mouth 2 (Two) Times a Day With Meals., Disp: 360 capsule, Rfl: 3  •  Lancets (freestyle) lancets, Test 3 times daily, Disp: 300 each, Rfl: 2  •  losartan-hydrochlorothiazide (HYZAAR) 50-12.5 MG per tablet, Take 1 tablet by mouth Daily., Disp: 90 tablet, Rfl: 1  •  nateglinide (STARLIX) 120 MG tablet, Take 1 tablet by mouth 3 (Three) Times a Day., Disp: 270 tablet, Rfl: 1  •  omeprazole (priLOSEC) 20 MG capsule, Take 1 capsule by mouth Daily., Disp: 30 capsule, Rfl: 1  •  pioglitazone (Actos) 30 MG tablet, Take 1 tablet by mouth Daily., Disp: 90 tablet, Rfl: 1  •  Trulicity 1.5 MG/0.5ML solution pen-injector, Inject 1.5 mg under the skin into the appropriate area as directed 1 (One) Time Per Week., Disp: 12 pen, Rfl: 3      Labs:  There were no vitals filed for this visit.  There were no vitals filed for this visit.  Lab Results   Component Value Date    HGBA1C 7.90 (H) 05/14/2021     Lab Results   Component Value Date    GLUCOSE 131 (H) 05/20/2020    CALCIUM 9.8 05/14/2021     05/14/2021    K 4.5 05/14/2021    CO2 25.7 05/14/2021     (H) 05/14/2021    BUN 24 (H) 05/14/2021    CREATININE 1.11 05/14/2021    EGFRIFAFRI 79 05/14/2021    EGFRIFNONA 65 05/14/2021    BCR 21.6 05/14/2021    ANIONGAP 12.8 05/20/2020     Lab Results   Component Value Date    CHLPL 153 05/14/2021    TRIG 116 05/14/2021    HDL 47 05/14/2021    LDL 85 05/14/2021         Drug-Drug Interactions:   No significant DDIs were noted.        Medication Assessment:   1. Aspirin - Not Indicated   2. Statin - Currently Taking (atorvastatin)  3. ACEi/ARB - Currently Taking (losartan)      Medication Education on Specialty Medication:  Xigduo  · Discussed the medication's MOA and that it may cause more frequent urination.  Encouraged to drink plenty  of water as to not get dehydrated  · Also discussed there may be an increased incidence of UTIs or yeast infections and to monitor for signs/symptoms  · Encouraged to take with food as it may cause N/V/D if taken on empty stomach       Trulicity  · Discussed the medication's MOA and that it helps you feel full, helps your body release more insulin and helps your body make less sugar after meals.  · Also discussed that N/V/D tend to be the most common adverse effects, especially if larger meals are eaten.  Encouraged smaller meals throughout the day.  · Do not start the medication if you have h/o pancreatitis or thyroid cancer.  · Take missed dose as soon as remember and if it is less than 3 days until the next dose, skip the missed dose and get back on track; do not take 2 doses or extra doses.        Assessment / Plan:  1. The patient was provided medication education via verbal information. Patient expressed understanding and had no further questions at this time.  2. Discussed the [facility] pharmacy services that are available to him, including monitoring of refills, prior authorizations, and copay coupon cards, as applicable, as well as curb-side pick-up or mail-order as needed.  3. Contact information for the pharmacy team was provided to the patient.        Petrona Negron, PharmD  5/28/2021  09:40 EDT

## 2021-11-29 ENCOUNTER — OFFICE VISIT (OUTPATIENT)
Dept: ENDOCRINOLOGY | Age: 71
End: 2021-11-29

## 2021-11-29 VITALS
DIASTOLIC BLOOD PRESSURE: 77 MMHG | WEIGHT: 205.2 LBS | HEIGHT: 72 IN | HEART RATE: 92 BPM | OXYGEN SATURATION: 96 % | BODY MASS INDEX: 27.79 KG/M2 | SYSTOLIC BLOOD PRESSURE: 163 MMHG

## 2021-11-29 DIAGNOSIS — E78.2 MIXED HYPERLIPIDEMIA: ICD-10-CM

## 2021-11-29 DIAGNOSIS — E11.65 TYPE 2 DIABETES MELLITUS WITH HYPERGLYCEMIA, WITHOUT LONG-TERM CURRENT USE OF INSULIN (HCC): Primary | ICD-10-CM

## 2021-11-29 PROCEDURE — 99214 OFFICE O/P EST MOD 30 MIN: CPT | Performed by: NURSE PRACTITIONER

## 2021-11-29 RX ORDER — NATEGLINIDE 120 MG/1
120 TABLET ORAL 3 TIMES DAILY
Qty: 270 TABLET | Refills: 1 | Status: SHIPPED | OUTPATIENT
Start: 2021-11-29 | End: 2022-05-20 | Stop reason: SDUPTHER

## 2021-11-29 RX ORDER — ICOSAPENT ETHYL 1000 MG/1
2 CAPSULE ORAL 2 TIMES DAILY WITH MEALS
Qty: 360 CAPSULE | Refills: 3 | Status: SHIPPED | OUTPATIENT
Start: 2021-11-29 | End: 2021-11-29

## 2021-11-29 RX ORDER — DULAGLUTIDE 1.5 MG/.5ML
1.5 INJECTION, SOLUTION SUBCUTANEOUS WEEKLY
Qty: 12 PEN | Refills: 3 | Status: SHIPPED | OUTPATIENT
Start: 2021-11-29 | End: 2021-12-10 | Stop reason: DRUGHIGH

## 2021-11-29 RX ORDER — DAPAGLIFLOZIN AND METFORMIN HYDROCHLORIDE 5; 1000 MG/1; MG/1
1 TABLET, FILM COATED, EXTENDED RELEASE ORAL 2 TIMES DAILY
Qty: 180 TABLET | Refills: 1 | Status: SHIPPED | OUTPATIENT
Start: 2021-11-29 | End: 2022-05-20 | Stop reason: SDUPTHER

## 2021-11-29 RX ORDER — ICOSAPENT ETHYL 1000 MG/1
2 CAPSULE ORAL 2 TIMES DAILY WITH MEALS
Qty: 360 CAPSULE | Refills: 3 | Status: SHIPPED | OUTPATIENT
Start: 2021-11-29 | End: 2022-05-20 | Stop reason: SDUPTHER

## 2021-11-29 RX ORDER — PIOGLITAZONEHYDROCHLORIDE 30 MG/1
30 TABLET ORAL DAILY
Qty: 90 TABLET | Refills: 1 | Status: SHIPPED | OUTPATIENT
Start: 2021-11-29 | End: 2022-05-20 | Stop reason: SDUPTHER

## 2021-11-29 RX ORDER — ATORVASTATIN CALCIUM 20 MG/1
20 TABLET, FILM COATED ORAL NIGHTLY
Qty: 90 TABLET | Refills: 0 | Status: SHIPPED | OUTPATIENT
Start: 2021-11-29

## 2021-11-29 NOTE — PROGRESS NOTES
"Chief Complaint  Diabetes Mellitus (type 2)    Subjective          Emmanuel Roach presents to [facility] ENDOCRINOLOGY  History of Present Illness     I have reviewed PMH, allergies and medications UTD at this visit     Type 2 dm    Diagnosed about 10 years ago.   Today in clinic pt reports being on trulicity 1.5mg weekly, xigduo 5-1000mg BID, nateglinide 120mg TIDAC , Actos 30mg daily  Checks BG - daily, < 130  Dm retinopathy - denies, Last eye exam - 9/2021  Dm nephropathy - denies  Dm neuropathy - denies  CAD - denies  CVA - denies  Episodes of hypoglycemia - denies  Pt is not physically active. weight is stable   Pt does not follow DM diet for most part but has been eating less.   On ARB and statin     H/o prostate cancer     Objective   Vital Signs:   /77   Pulse 92   Ht 182.9 cm (72\")   Wt 93.1 kg (205 lb 3.2 oz)   SpO2 96%   BMI 27.83 kg/m²     Physical Exam  Vitals reviewed.   Constitutional:       General: He is not in acute distress.  HENT:      Head: Normocephalic and atraumatic.   Cardiovascular:      Rate and Rhythm: Normal rate and regular rhythm.   Pulmonary:      Effort: Pulmonary effort is normal. No respiratory distress.   Musculoskeletal:         General: No signs of injury. Normal range of motion.      Cervical back: Normal range of motion and neck supple.   Skin:     General: Skin is warm and dry.   Neurological:      Mental Status: He is alert and oriented to person, place, and time. Mental status is at baseline.   Psychiatric:         Mood and Affect: Mood normal.         Behavior: Behavior normal.         Thought Content: Thought content normal.         Judgment: Judgment normal.          Result Review :   The following data was reviewed by: EDVIN Sierra on 11/29/2021:  Common labs    Common Labsle 3/3/21 3/3/21 5/14/21 5/14/21 5/14/21 5/14/21    0922 0922 0758 [id number] [id number]   Glucose   152 (A)      Glucose  188 (A)       BUN  28 (A) 24 (A)    "   Creatinine  1.3 1.11      eGFR Non  Am   65      eGFR African Am   79      Sodium  140 145      Potassium  4.5 4.5      Chloride  100 108 (A)      Calcium  9.6 9.8      Total Protein   6.8      Albumin   4.50      Total Bilirubin   0.5      Alkaline Phosphatase   82      AST (SGOT)   19      ALT (SGPT)   26      Total Cholesterol    153     Triglycerides    116     HDL Cholesterol    47     LDL Cholesterol     85     Hemoglobin A1C 7.9 (A)     7.90 (A)   Microalbumin, Urine     7.1    (A) Abnormal value       Comments are available for some flowsheets but are not being displayed.                     Assessment and Plan    Diagnoses and all orders for this visit:    1. Type 2 diabetes mellitus with hyperglycemia, without long-term current use of insulin (HCC) (Primary)  -     Hemoglobin A1c  -     Comprehensive Metabolic Panel  -     dapagliflozin-metformin HCl ER (Xigduo XR) 5-1000 MG tablet; Take 1 tablet by mouth 2 (Two) Times a Day.  Dispense: 180 tablet; Refill: 1  -     Trulicity 1.5 MG/0.5ML solution pen-injector; Inject 1.5 mg under the skin into the appropriate area as directed 1 (One) Time Per Week.  Dispense: 12 pen; Refill: 3  -     pioglitazone (Actos) 30 MG tablet; Take 1 tablet by mouth Daily.  Dispense: 90 tablet; Refill: 1  -     nateglinide (STARLIX) 120 MG tablet; Take 1 tablet by mouth 3 (Three) Times a Day.  Dispense: 270 tablet; Refill: 1    2. Mixed hyperlipidemia  -     Hemoglobin A1c  -     Comprehensive Metabolic Panel  -     Discontinue: Vascepa 1 g capsule capsule; Take 2 g by mouth 2 (Two) Times a Day With Meals.  Dispense: 360 capsule; Refill: 3  -     Vascepa 1 g capsule capsule; Take 2 g by mouth 2 (Two) Times a Day With Meals.  Dispense: 360 capsule; Refill: 3  -     atorvastatin (LIPITOR) 20 MG tablet; Take 1 tablet by mouth Every Night.  Dispense: 90 tablet; Refill: 0        Follow Up   Return in about 6 months (around 5/29/2022).     Patient will see Dr. Saenz in May for  f/u  A1c above goal  Manual /68- continue to monitor at home   Labs today  Continue to improve diet  recommend increased physical activity but it is hard with his back pain and arthritis   On arb and statin     Patient was given instructions and counseling regarding his condition or for health maintenance advice. Please see specific information pulled into the AVS if appropriate.     Lola Edwards, APRN

## 2021-11-30 LAB
ALBUMIN SERPL-MCNC: 4.5 G/DL (ref 3.7–4.7)
ALBUMIN/GLOB SERPL: 1.5 {RATIO} (ref 1.2–2.2)
ALP SERPL-CCNC: 106 IU/L (ref 44–121)
ALT SERPL-CCNC: 29 IU/L (ref 0–44)
AST SERPL-CCNC: 18 IU/L (ref 0–40)
BILIRUB SERPL-MCNC: 0.6 MG/DL (ref 0–1.2)
BUN SERPL-MCNC: 29 MG/DL (ref 8–27)
BUN/CREAT SERPL: 21 (ref 10–24)
CALCIUM SERPL-MCNC: 9.6 MG/DL (ref 8.6–10.2)
CHLORIDE SERPL-SCNC: 103 MMOL/L (ref 96–106)
CO2 SERPL-SCNC: 20 MMOL/L (ref 20–29)
CREAT SERPL-MCNC: 1.38 MG/DL (ref 0.76–1.27)
GLOBULIN SER CALC-MCNC: 3 G/DL (ref 1.5–4.5)
GLUCOSE SERPL-MCNC: 161 MG/DL (ref 65–99)
HBA1C MFR BLD: 8.4 % (ref 4.8–5.6)
POTASSIUM SERPL-SCNC: 4.5 MMOL/L (ref 3.5–5.2)
PROT SERPL-MCNC: 7.5 G/DL (ref 6–8.5)
REPORT: NORMAL
SODIUM SERPL-SCNC: 142 MMOL/L (ref 134–144)

## 2021-12-10 RX ORDER — DULAGLUTIDE 3 MG/.5ML
3 INJECTION, SOLUTION SUBCUTANEOUS WEEKLY
Qty: 12 PEN | Refills: 0 | Status: SHIPPED | OUTPATIENT
Start: 2021-12-10 | End: 2022-03-04 | Stop reason: SDUPTHER

## 2022-03-04 ENCOUNTER — TELEPHONE (OUTPATIENT)
Dept: ENDOCRINOLOGY | Age: 72
End: 2022-03-04

## 2022-03-04 RX ORDER — DULAGLUTIDE 3 MG/.5ML
3 INJECTION, SOLUTION SUBCUTANEOUS WEEKLY
Qty: 12 PEN | Refills: 0 | Status: SHIPPED | OUTPATIENT
Start: 2022-03-04 | End: 2022-05-20 | Stop reason: SDUPTHER

## 2022-03-04 NOTE — TELEPHONE ENCOUNTER
Patient left voice mail for refill of trulicity 3 mg to [facility] pharmacy   Patient notified by voice mail Prescription sent to pharmacy

## 2022-05-13 ENCOUNTER — DOCUMENTATION (OUTPATIENT)
Dept: ENDOCRINOLOGY | Age: 72
End: 2022-05-13

## 2022-05-13 NOTE — PROGRESS NOTES
Benefits Investigation Summary    Prescription: Renewal     [facility]    Copay amount:    Pharmacy Payor/Plan: william  BIN: [id number]  PCN: [id number]  GROUP: [id number]    Prior Auth and Med Assistance notes:

## 2022-05-20 ENCOUNTER — OFFICE VISIT (OUTPATIENT)
Dept: ENDOCRINOLOGY | Age: 72
End: 2022-05-20

## 2022-05-20 VITALS
WEIGHT: 189 LBS | TEMPERATURE: 97.3 F | HEIGHT: 72 IN | HEART RATE: 78 BPM | BODY MASS INDEX: 25.6 KG/M2 | OXYGEN SATURATION: 98 % | SYSTOLIC BLOOD PRESSURE: 145 MMHG | DIASTOLIC BLOOD PRESSURE: 79 MMHG

## 2022-05-20 DIAGNOSIS — E78.2 MIXED HYPERLIPIDEMIA: ICD-10-CM

## 2022-05-20 DIAGNOSIS — E11.65 TYPE 2 DIABETES MELLITUS WITH HYPERGLYCEMIA, WITHOUT LONG-TERM CURRENT USE OF INSULIN: Primary | ICD-10-CM

## 2022-05-20 PROCEDURE — 99214 OFFICE O/P EST MOD 30 MIN: CPT | Performed by: NURSE PRACTITIONER

## 2022-05-20 RX ORDER — PIOGLITAZONEHYDROCHLORIDE 30 MG/1
30 TABLET ORAL DAILY
Qty: 90 TABLET | Refills: 1 | Status: SHIPPED | OUTPATIENT
Start: 2022-05-20 | End: 2023-01-23 | Stop reason: SDUPTHER

## 2022-05-20 RX ORDER — DAPAGLIFLOZIN AND METFORMIN HYDROCHLORIDE 5; 1000 MG/1; MG/1
1 TABLET, FILM COATED, EXTENDED RELEASE ORAL DAILY
Qty: 180 TABLET | Refills: 1 | Status: SHIPPED | OUTPATIENT
Start: 2022-05-20 | End: 2022-08-30

## 2022-05-20 RX ORDER — NATEGLINIDE 120 MG/1
120 TABLET ORAL 3 TIMES DAILY
Qty: 270 TABLET | Refills: 1 | Status: SHIPPED | OUTPATIENT
Start: 2022-05-20 | End: 2023-01-26 | Stop reason: SDUPTHER

## 2022-05-20 RX ORDER — ICOSAPENT ETHYL 1000 MG/1
2 CAPSULE ORAL 2 TIMES DAILY WITH MEALS
Qty: 360 CAPSULE | Refills: 3 | Status: SHIPPED | OUTPATIENT
Start: 2022-05-20

## 2022-05-20 RX ORDER — DULAGLUTIDE 3 MG/.5ML
3 INJECTION, SOLUTION SUBCUTANEOUS WEEKLY
Qty: 12 PEN | Refills: 0 | Status: SHIPPED | OUTPATIENT
Start: 2022-05-20 | End: 2022-08-24 | Stop reason: SDUPTHER

## 2022-05-20 NOTE — PROGRESS NOTES
"Chief Complaint  Diabetes (Type2:Patient evens have meter with him today states his normal range is 130) and Hyperglycemia    Subjective          Emmanuel Roach presents to [facility] ENDOCRINOLOGY  History of Present Illness     Got a new puppy    Type 2 dm    Diagnosed about 10 years ago.   Today in clinic pt reports being on trulicity 3mg weekly, xigduo 5-1000mg BID, nateglinide 120mg TIDAC , Actos 30mg daily  Checks BG - daily, 130s  Dm retinopathy - denies, Last eye exam - 9/2021  Dm nephropathy - denies  Dm neuropathy - denies  CAD - denies  CVA - denies  Episodes of hypoglycemia - denies  On ARB and statin with vascepa      H/o prostate cancer      Objective   Vital Signs:  /79   Pulse 78   Temp 97.3 °F (36.3 °C)   Ht 182.9 cm (72\")   Wt 85.7 kg (189 lb)   SpO2 98%   BMI 25.63 kg/m²            Physical Exam  Vitals reviewed.   Constitutional:       General: He is not in acute distress.  HENT:      Head: Normocephalic and atraumatic.   Cardiovascular:      Rate and Rhythm: Normal rate and regular rhythm.   Pulmonary:      Effort: Pulmonary effort is normal. No respiratory distress.   Musculoskeletal:         General: No signs of injury. Normal range of motion.      Cervical back: Normal range of motion and neck supple.   Skin:     General: Skin is warm and dry.   Neurological:      Mental Status: He is alert and oriented to person, place, and time. Mental status is at baseline.   Psychiatric:         Mood and Affect: Mood normal.         Behavior: Behavior normal.         Thought Content: Thought content normal.         Judgment: Judgment normal.        Result Review :     Common labs    Common Labsle 11/29/21 11/29/21 5/6/22 5/6/22 5/6/22 5/6/22    0950 0950 0944 0944 0944 0944   Glucose  161 (A)  137 (A)     BUN  29 (A)  29 (A)     Creatinine  1.38 (A)  1.37 (A)     eGFR Non  Am  51 (A)       eGFR  Am  59 (A)       Sodium  142  143     Potassium  4.5  4.8   "   Chloride  103  104     Calcium  9.6  9.7     Total Protein  7.5  7.2     Albumin  4.5  4.6     Total Bilirubin  0.6  0.4     Alkaline Phosphatase  106  85     AST (SGOT)  18  18     ALT (SGPT)  29  21     Total Cholesterol     126    Triglycerides     97    HDL Cholesterol     42    LDL Cholesterol      66    Hemoglobin A1C 8.4 (A)  7.2 (A)      Microalbumin, Urine      <3.0   (A) Abnormal value       Comments are available for some flowsheets but are not being displayed.                     Assessment and Plan    Diagnoses and all orders for this visit:    1. Type 2 diabetes mellitus with hyperglycemia, without long-term current use of insulin (HCC) (Primary)  -     Hemoglobin A1c; Future  -     Comprehensive Metabolic Panel; Future  -     Lipid Panel; Future  -     Microalbumin / Creatinine Urine Ratio - Urine, Clean Catch; Future  -     Dulaglutide (Trulicity) 3 MG/0.5ML solution pen-injector; Inject 0.5 mL under the skin into the appropriate area as directed 1 (One) Time Per Week.  Dispense: 12 pen; Refill: 0  -     nateglinide (STARLIX) 120 MG tablet; Take 1 tablet by mouth 3 (Three) Times a Day.  Dispense: 270 tablet; Refill: 1  -     pioglitazone (Actos) 30 MG tablet; Take 1 tablet by mouth Daily.  Dispense: 90 tablet; Refill: 1  -     dapagliflozin-metformin HCl ER (Xigduo XR) 5-1000 MG tablet; Take 1 tablet by mouth Daily.  Dispense: 180 tablet; Refill: 1  -     Ambulatory Referral to Podiatry    2. Mixed hyperlipidemia  -     Hemoglobin A1c; Future  -     Comprehensive Metabolic Panel; Future  -     Lipid Panel; Future  -     Microalbumin / Creatinine Urine Ratio - Urine, Clean Catch; Future  -     Vascepa 1 g capsule capsule; Take 2 g by mouth 2 (Two) Times a Day With Meals.  Dispense: 360 capsule; Refill: 3             Follow Up   Return in about 3 months (around 8/20/2022).     a1c improving to goal  LDL at goal  Concerned with orthostatic hypotension during the afternoon- stop morning dose of xigduo  and assess BP and BS control  Continue other diabetic regimen as above   Podiatry referral   Continue statin and vascepa     Patient was given instructions and counseling regarding his condition or for health maintenance advice. Please see specific information pulled into the AVS if appropriate.     EDVIN Sierra

## 2022-08-24 ENCOUNTER — TELEPHONE (OUTPATIENT)
Dept: ENDOCRINOLOGY | Age: 72
End: 2022-08-24

## 2022-08-24 DIAGNOSIS — E11.65 TYPE 2 DIABETES MELLITUS WITH HYPERGLYCEMIA, WITHOUT LONG-TERM CURRENT USE OF INSULIN: ICD-10-CM

## 2022-08-24 RX ORDER — DULAGLUTIDE 3 MG/.5ML
3 INJECTION, SOLUTION SUBCUTANEOUS WEEKLY
Qty: 12 PEN | Refills: 0 | Status: SHIPPED | OUTPATIENT
Start: 2022-08-24 | End: 2022-11-18 | Stop reason: SDUPTHER

## 2022-08-24 NOTE — TELEPHONE ENCOUNTER
Patient Education     Poison Ivy Dermatitis (Child)  Poison ivy dermatitis is a skin rash. It is caused by the oil found in the poison ivy plant. The oil is called urushiol. Symptoms can start within a few hours to a few days after contact with the plant. They include an itchy rash, redness, and swelling of the skin. Small blisters can form, which can then break and leak fluid. This fluid is not contagious to others. Only the plant oil can cause rash. The rash usually starts to go away after 1 to 2 weeks, but it may take 4 to 6 weeks to fully clear.  Home care  Your child s healthcare provider may prescribe medicine to help relieve itching and swelling. These may include steroid cream, antihistamines, or calamine lotion. For more severe cases, oral medicine or medicine injected into a muscle  may be given. Follow all instructions for giving medicines to your child.  The diagnosis is usually made by the clinical appearance and the history.  General care    Follow the healthcare provider s instructions on how to care for your child s rash.    Wash your hands with soap and warm water before and after caring for your child.    The rash can spread if traces of the plant oil remain on your child s skin and under fingernails. Gently wash the affected areas of the skin and under fingernails with soap and warm water. If needed, over-the-counter products can be used to help remove the plant oil from the skin shortly after exposure.    Bathe your child in cool water. Adding oatmeal powder or aluminum acetate powder to the water may help soothe itchy skin. These are available over the counter.    Expose the affected skin to the air so that it dries completely. Don't use a hair dryer on the skin.    Dress your child in loose cotton clothing.    Make sure your child does not scratch the affected area. This can delay healing. It can also cause a bacterial infection. You may need to use soft  scratch mittens  that cover your  rx sent   child s hands. Keep your child s nails trimmed short. You may need to put gloves on small children to prevent scratching. Hydrocortisone cream (1%) is available over the counter and can reduce itching. Benadryl may be given by mouth if the itching is bothersome.    Put cold compresses on your child s sores to help soothe symptoms. Do this for 30 minutes 3 to 4 times a day. You can make a cold compress by soaking a cloth in cold water. Squeeze out excess water. You can add colloidal oatmeal to the water to help reduce itching.    You can also help relieve large areas of itching by giving your child a lukewarm bath with colloidal oatmeal added to the water.    Make sure to wash the clothes your child was wearing when in contact with the plant. This washes away the plant oil that gave your child the rash and prevent more or worse symptoms.    If you have pets that play outdoors, be sure to wash them as well. The oil that causes poison ivy can be transferred from their fur to your child's skin.    Check your child s skin every day for the signs of a worsening rash or infection listed below.  Prevention  Follow these steps to help prevent poison ivy dermatitis:    If your child is exposed to poison ivy, use a poison ivy wash to remove the plant oil from the skin. Poison ivy wash can be bought in a drugstore with no prescription. The sooner you remove the oil, the more it will help prevent rash. The oil can irritate the skin quickly, but a wash it can still help hours later.    Wash anything that may have touched the plant oil. This includes clothing, shoes, and toys. Oil can stay on these items for weeks if not washed.    The oil can also get on a pet s fur. If your pet has been in an area where there is poison ivy, clean your pet s fur. Otherwise the animal can rub the oil on you and your children.    If your child has very sensitive skin, he or she should wear long shirts, pants, or gloves even when it is hot outside.  Learn what the plant looks like to avoid touching it.    If your child is very sensitive, consider using an ivy block skin product before they are potentially exposed. There is no guarantee that this will work, however.  Follow-up care  Follow up with your child s healthcare provider, or as advised. Contact your child s healthcare provider if the rash is not better in 2 weeks.  Special note to parents  Wash your hands well with soap and warm water before and after caring for your child. This helps prevent infection.  When to seek medical advice  Call your child s healthcare provider right away if any of these occur:    Redness or swelling that gets worse    Symptoms that don t get better after 1 week of treatment    Rash spreads to the face or groin, causing swelling    Pain, redness, or swelling that gets worse    Foul-smelling fluid leaking from the skin    Fussiness or crying that cannot be soothed    Fever (see Fever and children, below)  Fever and children  Always use a digital thermometer to check your child s temperature. Never use a mercury thermometer.  For infants and toddlers, be sure to use a rectal thermometer correctly. A rectal thermometer may accidentally poke a hole in (perforate) the rectum. It may also pass on germs from the stool. Always follow the product maker s directions for proper use. If you don t feel comfortable taking a rectal temperature, use another method. When you talk to your child s healthcare provider, tell him or her which method you used to take your child s temperature.  Here are guidelines for fever temperature. Ear temperatures aren t accurate before 6 months of age. Don t take an oral temperature until your child is at least 4 years old.  Infant under 3 months old:    Ask your child s healthcare provider how you should take the temperature.    Rectal or forehead (temporal artery) temperature of 100.4 F (38 C) or higher, or as directed by the provider    Armpit temperature of  99 F (37.2 C) or higher, or as directed by the provider  Child age 3 to 36 months:    Rectal, forehead (temporal artery), or ear temperature of 102 F (38.9 C) or higher, or as directed by the provider    Armpit temperature of 101 F (38.3 C) or higher, or as directed by the provider  Child of any age:    Repeated temperature of 104 F (40 C) or higher, or as directed by the provider    Fever that lasts more than 24 hours in a child under 2 years old. Or a fever that lasts for 3 days in a child 2 years or older.  Date Last Reviewed: 6/1/2018 2000-2019 [employer]. [address] [place] [address], [place] [id number]. All rights reserved. This information is not intended as a substitute for professional medical care. Always follow your healthcare professional's instructions.         Zanfel  Calamine lotion and triamcinolone for the itch

## 2022-08-24 NOTE — TELEPHONE ENCOUNTER
PT CALLED IN REQUESTING A REFILL OF HIS TRULICITY PRESCRIPTION SENT TO [facility]. HE IS OUT OF MEDICATION.

## 2022-08-30 ENCOUNTER — OFFICE VISIT (OUTPATIENT)
Dept: ENDOCRINOLOGY | Age: 72
End: 2022-08-30

## 2022-08-30 VITALS
TEMPERATURE: 96.4 F | OXYGEN SATURATION: 97 % | DIASTOLIC BLOOD PRESSURE: 62 MMHG | WEIGHT: 191.2 LBS | SYSTOLIC BLOOD PRESSURE: 112 MMHG | HEART RATE: 78 BPM | BODY MASS INDEX: 25.9 KG/M2 | HEIGHT: 72 IN

## 2022-08-30 DIAGNOSIS — E78.2 MIXED HYPERLIPIDEMIA: ICD-10-CM

## 2022-08-30 DIAGNOSIS — E11.65 TYPE 2 DIABETES MELLITUS WITH HYPERGLYCEMIA, WITHOUT LONG-TERM CURRENT USE OF INSULIN: Primary | ICD-10-CM

## 2022-08-30 PROCEDURE — 99214 OFFICE O/P EST MOD 30 MIN: CPT | Performed by: NURSE PRACTITIONER

## 2022-08-30 RX ORDER — ASPIRIN 81 MG/1
81 TABLET, CHEWABLE ORAL DAILY
COMMUNITY

## 2022-08-30 RX ORDER — DAPAGLIFLOZIN AND METFORMIN HYDROCHLORIDE 5; 1000 MG/1; MG/1
1 TABLET, FILM COATED, EXTENDED RELEASE ORAL 2 TIMES DAILY
Qty: 180 TABLET | Refills: 1
Start: 2022-08-30 | End: 2023-03-28 | Stop reason: SDUPTHER

## 2022-08-30 RX ORDER — LORATADINE 10 MG/1
CAPSULE, LIQUID FILLED ORAL
COMMUNITY

## 2022-08-30 NOTE — PROGRESS NOTES
"Chief Complaint  Diabetes    Subjective        Emmanuel Roach presents to [facility] ENDOCRINOLOGY  History of Present Illness     Type 2 dm    Diagnosed about 10 years ago.   Today in clinic pt reports being on trulicity 3mg weekly, xigduo 5-1000mg qpm- dose was decreased to see if hypotension resolves, nateglinide 120mg TIDAC , Actos 30mg daily  Checks BG - daily, 130s  Dm retinopathy - denies, Last eye exam - 9/2021  Dm nephropathy - denies  Dm neuropathy - denies  CAD - denies  CVA - denies  Episodes of hypoglycemia - denies  On ARB and statin with vascepa      H/o prostate cancer   Objective   Vital Signs:  /62   Pulse 78   Temp 96.4 °F (35.8 °C)   Ht 182.9 cm (72.01\")   Wt 86.7 kg (191 lb 3.2 oz)   SpO2 97%   BMI 25.93 kg/m²   Estimated body mass index is 25.93 kg/m² as calculated from the following:    Height as of this encounter: 182.9 cm (72.01\").    Weight as of this encounter: 86.7 kg (191 lb 3.2 oz).          Physical Exam  Vitals reviewed.   Constitutional:       General: He is not in acute distress.  HENT:      Head: Normocephalic and atraumatic.   Cardiovascular:      Rate and Rhythm: Normal rate and regular rhythm.   Pulmonary:      Effort: Pulmonary effort is normal. No respiratory distress.   Musculoskeletal:         General: No signs of injury. Normal range of motion.      Cervical back: Normal range of motion and neck supple.   Skin:     General: Skin is warm and dry.   Neurological:      Mental Status: He is alert and oriented to person, place, and time. Mental status is at baseline.   Psychiatric:         Mood and Affect: Mood normal.         Behavior: Behavior normal.         Thought Content: Thought content normal.         Judgment: Judgment normal.        Result Review :  The following data was reviewed by: EDVIN Sierra on 08/30/2022:  Common labs    Common Labsle 11/29/21 11/29/21 5/6/22 5/6/22 5/6/22 5/6/22 8/16/22 8/16/22 8/16/22 8/16/22    0950 " 0950 0944 0944 0944 0944 0930 0930 0930 0930   Glucose  161 (A)  137 (A)    157 (A)     BUN  29 (A)  29 (A)    31 (A)     Creatinine  1.38 (A)  1.37 (A)    1.45 (A)     eGFR Non  Am  51 (A)           eGFR  Am  59 (A)           Sodium  142  143    141     Potassium  4.5  4.8    4.4     Chloride  103  104    103     Calcium  9.6  9.7    9.3     Total Protein  7.5  7.2    7.1     Albumin  4.5  4.6    4.7     Total Bilirubin  0.6  0.4    0.4     Alkaline Phosphatase  106  85    79     AST (SGOT)  18  18    19     ALT (SGPT)  29  21    20     Total Cholesterol     126    155    Triglycerides     97    105    HDL Cholesterol     42    56    LDL Cholesterol      66    80    Hemoglobin A1C 8.4 (A)  7.2 (A)    7.5 (A)      Microalbumin, Urine      <3.0    4.5   (A) Abnormal value       Comments are available for some flowsheets but are not being displayed.                     Assessment and Plan {CC Problem List  Visit Diagnosis   ROS  Review (Popup)  TapToLearn Maintenance  Quality  BestPractice  Medications  SmartSets  SnapShot Encounters  Media :23}  Diagnoses and all orders for this visit:    1. Type 2 diabetes mellitus with hyperglycemia, without long-term current use of insulin (HCC) (Primary)  -     dapagliflozin-metformin HCl ER (Xigduo XR) 5-1000 MG tablet; Take 1 tablet by mouth 2 (Two) Times a Day.  Dispense: 180 tablet; Refill: 1  -     Basic Metabolic Panel; Future    2. Mixed hyperlipidemia             Follow Up   No follow-ups on file.     BP UNCHANGED, stable, will change xigduo back to BID, a1c slightly elevated but caution with renal function    Repeat BMP in one month with focus on increased hydration with water, will need to consider renal eval or change off metformin/farxiga if worsening or not improving- updated dr vanegas on plan   F/u with guilherme end of October- will do f/u labs for that appt same day  LDL at goal, continue statin    Patient was given instructions and counseling  regarding his condition or for health maintenance advice. Please see specific information pulled into the AVS if appropriate.     EDVIN Sierra

## 2022-09-30 ENCOUNTER — LAB (OUTPATIENT)
Dept: ENDOCRINOLOGY | Age: 72
End: 2022-09-30

## 2022-09-30 DIAGNOSIS — E11.65 TYPE 2 DIABETES MELLITUS WITH HYPERGLYCEMIA, WITHOUT LONG-TERM CURRENT USE OF INSULIN: ICD-10-CM

## 2022-10-01 LAB
BUN SERPL-MCNC: 33 MG/DL (ref 8–23)
BUN/CREAT SERPL: 22 (ref 7–25)
CALCIUM SERPL-MCNC: 10 MG/DL (ref 8.6–10.5)
CHLORIDE SERPL-SCNC: 103 MMOL/L (ref 98–107)
CO2 SERPL-SCNC: 26.4 MMOL/L (ref 22–29)
CREAT SERPL-MCNC: 1.5 MG/DL (ref 0.76–1.27)
EGFRCR SERPLBLD CKD-EPI 2021: 49.2 ML/MIN/1.73
GLUCOSE SERPL-MCNC: 147 MG/DL (ref 65–99)
POTASSIUM SERPL-SCNC: 4.4 MMOL/L (ref 3.5–5.2)
SODIUM SERPL-SCNC: 140 MMOL/L (ref 136–145)

## 2022-10-28 ENCOUNTER — OFFICE VISIT (OUTPATIENT)
Dept: ENDOCRINOLOGY | Age: 72
End: 2022-10-28

## 2022-10-28 VITALS
HEIGHT: 72 IN | OXYGEN SATURATION: 98 % | TEMPERATURE: 96.8 F | HEART RATE: 78 BPM | WEIGHT: 187.8 LBS | SYSTOLIC BLOOD PRESSURE: 108 MMHG | BODY MASS INDEX: 25.44 KG/M2 | DIASTOLIC BLOOD PRESSURE: 60 MMHG

## 2022-10-28 DIAGNOSIS — R79.89 ABNORMAL SERUM CREATININE LEVEL: ICD-10-CM

## 2022-10-28 DIAGNOSIS — E78.5 HYPERLIPIDEMIA ASSOCIATED WITH TYPE 2 DIABETES MELLITUS: ICD-10-CM

## 2022-10-28 DIAGNOSIS — E11.69 HYPERLIPIDEMIA ASSOCIATED WITH TYPE 2 DIABETES MELLITUS: ICD-10-CM

## 2022-10-28 DIAGNOSIS — E11.65 TYPE 2 DIABETES MELLITUS WITH HYPERGLYCEMIA, WITHOUT LONG-TERM CURRENT USE OF INSULIN: Primary | ICD-10-CM

## 2022-10-28 PROCEDURE — 99214 OFFICE O/P EST MOD 30 MIN: CPT | Performed by: INTERNAL MEDICINE

## 2022-10-28 NOTE — PROGRESS NOTES
"Chief Complaint  Chief Complaint   Patient presents with   • Diabetes     Testing bs 1-2 x day / last dm eye exam jan 2022/ no issues with feet        Subjective          History of Present Illness    Emmanuel Roach 72 y.o. presents with Type 2 dm as a F/u patient.     Patient used to see Dr. Ojeda in the past, transferred the care to me today.    Type 2 dm - Diagnosed about 10 + years ago.   Today in clinic pt reports being on starlix 120 mg po tid ac, trulicity 3 mg subq once a week, actos 30 mg po daily, xigduo XR - 1 tab bid.   Avg bg - 120 - 130  Checks BG - 1- 2 times/day  Sensor - x  Dm retinopathy -x ,Last eye exam - Jan 2022  Dm nephropathy - yes  Dm neuropathy - mild ,Dm neuropathy meds -   CAD -x  CVA -x  Episodes of hypoglycemia - x  Pt is physically active. weight has been stable.   Pt tries to follow DM diet for most part.   On ARB      Reviewed primary care physician's/consulting physician documentation and lab results         I have reviewed the patient's allergies, medicines, past medical hx, family hx and social hx in detail.    Objective   Vital Signs:   /60   Pulse 78   Temp 96.8 °F (36 °C) (Temporal)   Ht 182.9 cm (72.01\")   Wt 85.2 kg (187 lb 12.8 oz)   SpO2 98%   BMI 25.46 kg/m²   Physical Exam   General appearance - no distress  Eyes- anicteric sclera  Ear nose and throat-external ears and nose normal.    Respiratory-normal chest on inspection.  No respiratory distress noted.  Skin-no rashes.  Neuro-alert and oriented x3            Result Review :   The following data was reviewed by: Edwar Saenz MD on 10/28/2022:  Lab on 09/30/2022   Component Date Value Ref Range Status   • Glucose 09/30/2022 147 (H)  65 - 99 mg/dL Final   • BUN 09/30/2022 33 (H)  8 - 23 mg/dL Final   • Creatinine 09/30/2022 1.50 (H)  0.76 - 1.27 mg/dL Final   • EGFR Result 09/30/2022 49.2 (L)  >60.0 mL/min/1.73 Final    Comment: National Kidney Foundation and American Society of  Nephrology (ASN) Task " Force recommended calculation based  on the Chronic Kidney Disease Epidemiology Collaboration  (CKD-EPI) equation refit without adjustment for race.  The GFR formula is only valid for adults with stable renal  function between ages 18 and 70.     • BUN/Creatinine Ratio 09/30/2022 22.0  7.0 - 25.0 Final   • Sodium 09/30/2022 140  136 - 145 mmol/L Final   • Potassium 09/30/2022 4.4  3.5 - 5.2 mmol/L Final   • Chloride 09/30/2022 103  98 - 107 mmol/L Final   • Total CO2 09/30/2022 26.4  22.0 - 29.0 mmol/L Final   • Calcium 09/30/2022 10.0  8.6 - 10.5 mg/dL Final     Data reviewed: PCP and endocrine notes       Results Review:    I reviewed the patient's new clinical results.     Assessment and Plan    Problem List Items Addressed This Visit    None  Visit Diagnoses     Type 2 diabetes mellitus with hyperglycemia, without long-term current use of insulin (HCC)    -  Primary    Relevant Orders    Hemoglobin A1c    TSH    T4, Free    Basic Metabolic Panel    Hemoglobin A1c    Lipid Panel    Vitamin B12 & Folate    Hyperlipidemia associated with type 2 diabetes mellitus (HCC)        Relevant Orders    Hemoglobin A1c    TSH    T4, Free    Basic Metabolic Panel    Hemoglobin A1c    Lipid Panel    Vitamin B12 & Folate    Abnormal serum creatinine level        Relevant Orders    Hemoglobin A1c    TSH    T4, Free    Basic Metabolic Panel    Hemoglobin A1c    Lipid Panel    Vitamin B12 & Folate        Type 2 diabetes mellitus-uncontrolled with hyperglycemia  Continue Trulicity  Continue Starlix, Actos, Xigduo.    Hyperlipidemia  Continue vascepa    All the above problems are new for me  Interpreted the blood work-up/imaging results performed by the primary care/consulting physician -    Refills sent to pharmacy    Follow Up     Patient was given instructions and counseling regarding her condition or for health maintenance advice. Please see specific information pulled into the AVS if appropriate.       Thank you for asking me to  "see your patient, Emmanuel Roach in consultation.         Edwar Saenz MD  10/28/22      EMR Dragon / transcription disclaimer:     \"Dictated utilizing Dragon dictation\".         "

## 2022-10-29 LAB — HBA1C MFR BLD: 7.2 % (ref 4.8–5.6)

## 2022-11-18 ENCOUNTER — TELEPHONE (OUTPATIENT)
Dept: ENDOCRINOLOGY | Age: 72
End: 2022-11-18

## 2022-11-18 DIAGNOSIS — E11.65 TYPE 2 DIABETES MELLITUS WITH HYPERGLYCEMIA, WITHOUT LONG-TERM CURRENT USE OF INSULIN: ICD-10-CM

## 2022-11-18 RX ORDER — DULAGLUTIDE 3 MG/.5ML
3 INJECTION, SOLUTION SUBCUTANEOUS WEEKLY
Qty: 6 ML | Refills: 1 | Status: SHIPPED | OUTPATIENT
Start: 2022-11-18 | End: 2023-02-16

## 2022-11-18 NOTE — TELEPHONE ENCOUNTER
PT CALLED NEEDING A REFILL ON HIS TRULICITY. HE ASKED IF THE BOTTLE CAN SAY REFILLS AVAILABLE SO HE DOESN'T HAVE TO CALL EVERY TIME HE NEEDS A REFILL.     PLEASE SEND TO [facility]

## 2023-01-23 ENCOUNTER — TELEPHONE (OUTPATIENT)
Dept: ENDOCRINOLOGY | Age: 73
End: 2023-01-23

## 2023-01-23 DIAGNOSIS — E11.65 TYPE 2 DIABETES MELLITUS WITH HYPERGLYCEMIA, WITHOUT LONG-TERM CURRENT USE OF INSULIN: ICD-10-CM

## 2023-01-23 RX ORDER — BLOOD-GLUCOSE METER
KIT MISCELLANEOUS
Qty: 300 EACH | Refills: 3 | Status: SHIPPED | OUTPATIENT
Start: 2023-01-23

## 2023-01-23 RX ORDER — LANCETS 28 GAUGE
EACH MISCELLANEOUS
Qty: 300 EACH | Refills: 3 | Status: SHIPPED | OUTPATIENT
Start: 2023-01-23

## 2023-01-23 RX ORDER — PIOGLITAZONEHYDROCHLORIDE 30 MG/1
30 TABLET ORAL DAILY
Qty: 90 TABLET | Refills: 1 | Status: SHIPPED | OUTPATIENT
Start: 2023-01-23 | End: 2023-04-10 | Stop reason: SDUPTHER

## 2023-01-23 NOTE — TELEPHONE ENCOUNTER
PT CALLED FOR A REFILL OF THE FOLLOWING MEDS:    pioglitazone (Actos) 30 MG tablet [15673]    FREESTYLE LITE test strip [19790]    Lancets (freestyle) lancets [43636]      GOING TO:    [facility] -  [place], [place] - [address] - [phone]  - [phone] FX

## 2023-01-26 ENCOUNTER — TELEPHONE (OUTPATIENT)
Dept: ENDOCRINOLOGY | Age: 73
End: 2023-01-26

## 2023-01-26 DIAGNOSIS — E11.65 TYPE 2 DIABETES MELLITUS WITH HYPERGLYCEMIA, WITHOUT LONG-TERM CURRENT USE OF INSULIN: ICD-10-CM

## 2023-01-26 RX ORDER — NATEGLINIDE 120 MG/1
120 TABLET ORAL 3 TIMES DAILY
Qty: 270 TABLET | Refills: 1 | Status: SHIPPED | OUTPATIENT
Start: 2023-01-26

## 2023-01-26 NOTE — TELEPHONE ENCOUNTER
PT CALLED FOR A MED REFILL REQUEST OF THE FOLLOWING MED:    nateglinide (STARLIX) 120 MG tablet [[id number]]        GOING TO:    [facility] - [place] [facility] - [address] - [phone]  - [phone] FX

## 2023-03-28 ENCOUNTER — OFFICE VISIT (OUTPATIENT)
Dept: ENDOCRINOLOGY | Age: 73
End: 2023-03-28
Payer: MEDICARE

## 2023-03-28 VITALS
BODY MASS INDEX: 26.82 KG/M2 | OXYGEN SATURATION: 99 % | WEIGHT: 198 LBS | HEART RATE: 78 BPM | HEIGHT: 72 IN | SYSTOLIC BLOOD PRESSURE: 134 MMHG | TEMPERATURE: 96.6 F | DIASTOLIC BLOOD PRESSURE: 74 MMHG

## 2023-03-28 DIAGNOSIS — E11.42 DIABETIC PERIPHERAL NEUROPATHY ASSOCIATED WITH TYPE 2 DIABETES MELLITUS: ICD-10-CM

## 2023-03-28 DIAGNOSIS — N18.31 STAGE 3A CHRONIC KIDNEY DISEASE: ICD-10-CM

## 2023-03-28 DIAGNOSIS — E11.65 TYPE 2 DIABETES MELLITUS WITH HYPERGLYCEMIA, WITHOUT LONG-TERM CURRENT USE OF INSULIN: Primary | ICD-10-CM

## 2023-03-28 DIAGNOSIS — E11.69 HYPERLIPIDEMIA ASSOCIATED WITH TYPE 2 DIABETES MELLITUS: ICD-10-CM

## 2023-03-28 DIAGNOSIS — E78.5 HYPERLIPIDEMIA ASSOCIATED WITH TYPE 2 DIABETES MELLITUS: ICD-10-CM

## 2023-03-28 RX ORDER — DAPAGLIFLOZIN AND METFORMIN HYDROCHLORIDE 5; 1000 MG/1; MG/1
1 TABLET, FILM COATED, EXTENDED RELEASE ORAL 2 TIMES DAILY
Qty: 180 TABLET | Refills: 1 | Status: SHIPPED | OUTPATIENT
Start: 2023-03-28

## 2023-03-28 NOTE — PROGRESS NOTES
"Chief Complaint  Diabetes (Type 2 Did not bring meter Usually  test at least once a day /Due for eye exam /Hx of diabetic neuropathy: mild case in both feet /No hx of diabetic retinopathy)    Subjective        Emmanuel Roach presents to [facility] ENDOCRINOLOGY  History of Present Illness     Lab Results   Component Value Date    HGBA1C 7.20 (H) 03/14/2023     Type 2 dm    Diagnosed about 10 + years ago.   Today in clinic pt reports being on starlix 120 mg po tid ac, trulicity 3 mg subq once a week, actos 30 mg po daily, xigduo 5-1000mg bid   Last eye exam - Jan 2022  Dm nephropathy - yes, on arb   Dm neuropathy - yes, stable   CAD - denies   CVA - denies   Episodes of hypoglycemia - none reported   On statin and vascepa   Lab Results   Component Value Date    CHLPL 151 03/14/2023    TRIG 97 03/14/2023    HDL 51 03/14/2023    LDL 82 03/14/2023       Objective   Vital Signs:  /74   Pulse 78   Temp 96.6 °F (35.9 °C) (Temporal)   Ht 182.9 cm (72\")   Wt 89.8 kg (198 lb)   SpO2 99%   BMI 26.85 kg/m²   Estimated body mass index is 26.85 kg/m² as calculated from the following:    Height as of this encounter: 182.9 cm (72\").    Weight as of this encounter: 89.8 kg (198 lb).             Physical Exam  Vitals reviewed.   Constitutional:       General: He is not in acute distress.  HENT:      Head: Normocephalic and atraumatic.   Cardiovascular:      Rate and Rhythm: Normal rate.   Pulmonary:      Effort: Pulmonary effort is normal. No respiratory distress.   Musculoskeletal:         General: No signs of injury. Normal range of motion.      Cervical back: Normal range of motion and neck supple.   Skin:     General: Skin is warm and dry.   Neurological:      Mental Status: He is alert and oriented to person, place, and time. Mental status is at baseline.   Psychiatric:         Mood and Affect: Mood normal.         Behavior: Behavior normal.         Thought Content: Thought content normal.        "  Judgment: Judgment normal.        Result Review :  The following data was reviewed by: EDVIN Sierra on 03/28/2023:  Common labs    Common Labs 9/30/22 10/28/22 3/14/23 3/14/23 3/14/23      0902 0902 0902   Glucose 147 (A)  150 (A)     BUN 33 (A)  24 (A)     Creatinine 1.50 (A)  1.29 (A)     Sodium 140  144     Potassium 4.4  4.7     Chloride 103  105     Calcium 10.0  10.1     Total Cholesterol     151   Triglycerides     97   HDL Cholesterol     51   LDL Cholesterol      82   Hemoglobin A1C  7.20 (A)  7.20 (A)    (A) Abnormal value       Comments are available for some flowsheets but are not being displayed.                        Assessment and Plan   Diagnoses and all orders for this visit:    1. Type 2 diabetes mellitus with hyperglycemia, without long-term current use of insulin (HCC) (Primary)  -     dapagliflozin-metformin HCl ER (Xigduo XR) 5-1000 MG tablet; Take 1 tablet by mouth 2 (Two) Times a Day.  Dispense: 180 tablet; Refill: 1  -     Hemoglobin A1c; Future  -     Basic Metabolic Panel; Future  -     Hemoglobin A1c; Future  -     Basic Metabolic Panel; Future  -     Microalbumin / Creatinine Urine Ratio - Urine, Clean Catch; Future    2. Hyperlipidemia associated with type 2 diabetes mellitus (HCC)    3. Stage 3a chronic kidney disease (HCC)    4. Diabetic peripheral neuropathy associated with type 2 diabetes mellitus (HCC)             Follow Up   Return in about 4 years (around 3/28/2027).     Discussed and educated on ckd- verbal and written   a1c stable   Cholesterol labs favorable  Continue regimen as above   Arrange diabetic eye exam    Patient was given instructions and counseling regarding his condition or for health maintenance advice. Please see specific information pulled into the AVS if appropriate.     EDVIN Sierra

## 2023-04-10 DIAGNOSIS — E11.65 TYPE 2 DIABETES MELLITUS WITH HYPERGLYCEMIA, WITHOUT LONG-TERM CURRENT USE OF INSULIN: ICD-10-CM

## 2023-04-11 RX ORDER — PIOGLITAZONEHYDROCHLORIDE 30 MG/1
30 TABLET ORAL DAILY
Qty: 90 TABLET | Refills: 1 | Status: SHIPPED | OUTPATIENT
Start: 2023-04-11 | End: 2024-04-10

## 2023-05-05 ENCOUNTER — TELEPHONE (OUTPATIENT)
Dept: ENDOCRINOLOGY | Age: 73
End: 2023-05-05
Payer: MEDICARE

## 2023-05-17 DIAGNOSIS — E11.65 TYPE 2 DIABETES MELLITUS WITH HYPERGLYCEMIA, WITHOUT LONG-TERM CURRENT USE OF INSULIN: ICD-10-CM

## 2023-05-17 RX ORDER — DAPAGLIFLOZIN AND METFORMIN HYDROCHLORIDE 5; 1000 MG/1; MG/1
1 TABLET, FILM COATED, EXTENDED RELEASE ORAL 2 TIMES DAILY
Qty: 180 TABLET | Refills: 0 | Status: SHIPPED | OUTPATIENT
Start: 2023-05-17 | End: 2023-08-15

## 2023-05-17 NOTE — TELEPHONE ENCOUNTER
Rx Refill Note  Requested Prescriptions     Signed Prescriptions Disp Refills   • dapagliflozin-metformin HCl ER (Xigduo XR) 5-1000 MG tablet 180 tablet 0     Sig: Take 1 tablet by mouth 2 (Two) Times a Day for 90 days.     Authorizing Provider: ECHO BAUTISTA     Ordering User: KAITLIN MICHAELS      Last office visit with prescribing clinician: 3/28/2023   Last telemedicine visit with prescribing clinician: 5/5/2023   Next office visit with prescribing clinician: 7/26/2023                         Would you like a call back once the refill request has been completed: [] Yes [] No    If the office needs to give you a call back, can they leave a voicemail: [] Yes [] No    Kaitlin Michaels MA  05/17/23, 12:52 EDT

## 2023-07-26 ENCOUNTER — OFFICE VISIT (OUTPATIENT)
Dept: ENDOCRINOLOGY | Age: 73
End: 2023-07-26
Payer: MEDICARE

## 2023-07-26 VITALS
TEMPERATURE: 97 F | OXYGEN SATURATION: 97 % | HEIGHT: 72 IN | HEART RATE: 102 BPM | BODY MASS INDEX: 25.63 KG/M2 | SYSTOLIC BLOOD PRESSURE: 118 MMHG | WEIGHT: 189.2 LBS | DIASTOLIC BLOOD PRESSURE: 70 MMHG

## 2023-07-26 DIAGNOSIS — E78.2 MIXED HYPERLIPIDEMIA: ICD-10-CM

## 2023-07-26 DIAGNOSIS — E11.65 TYPE 2 DIABETES MELLITUS WITH HYPERGLYCEMIA, WITHOUT LONG-TERM CURRENT USE OF INSULIN: Primary | ICD-10-CM

## 2023-07-26 PROCEDURE — 3051F HG A1C>EQUAL 7.0%<8.0%: CPT | Performed by: NURSE PRACTITIONER

## 2023-07-26 PROCEDURE — 3078F DIAST BP <80 MM HG: CPT | Performed by: NURSE PRACTITIONER

## 2023-07-26 PROCEDURE — 99214 OFFICE O/P EST MOD 30 MIN: CPT | Performed by: NURSE PRACTITIONER

## 2023-07-26 PROCEDURE — 3074F SYST BP LT 130 MM HG: CPT | Performed by: NURSE PRACTITIONER

## 2023-07-26 RX ORDER — PIOGLITAZONEHYDROCHLORIDE 30 MG/1
30 TABLET ORAL DAILY
Qty: 90 TABLET | Refills: 1 | Status: SHIPPED | OUTPATIENT
Start: 2023-07-26 | End: 2024-07-25

## 2023-07-26 RX ORDER — ATORVASTATIN CALCIUM 20 MG/1
20 TABLET, FILM COATED ORAL NIGHTLY
Qty: 90 TABLET | Refills: 1 | Status: SHIPPED | OUTPATIENT
Start: 2023-07-26

## 2023-07-26 RX ORDER — NATEGLINIDE 120 MG/1
120 TABLET ORAL 3 TIMES DAILY
Qty: 270 TABLET | Refills: 1 | Status: SHIPPED | OUTPATIENT
Start: 2023-07-26

## 2023-07-26 RX ORDER — DAPAGLIFLOZIN AND METFORMIN HYDROCHLORIDE 5; 1000 MG/1; MG/1
1 TABLET, FILM COATED, EXTENDED RELEASE ORAL 2 TIMES DAILY
Qty: 180 TABLET | Refills: 1 | Status: SHIPPED | OUTPATIENT
Start: 2023-07-26 | End: 2023-10-24

## 2023-07-26 RX ORDER — ICOSAPENT ETHYL 1000 MG/1
2 CAPSULE ORAL 2 TIMES DAILY WITH MEALS
Qty: 360 CAPSULE | Refills: 3 | Status: SHIPPED | OUTPATIENT
Start: 2023-07-26

## 2023-07-26 NOTE — PROGRESS NOTES
"Chief Complaint  Diabetes (TYPE 2 DM )    Subjective        Emmanuel Roach presents to [facility] ENDOCRINOLOGY  History of Present Illness    Has been in the ER the last couple day with his wife who has uti and sepsis     Type 2 dm    Diagnosed about 10 + years ago.   Today in clinic pt reports being on starlix 120 mg po tid ac, trulicity 3 mg subq once a week, actos 30 mg po daily, xigduo 5-1000mg bid   HI: 202  Lo: 90s  Last eye exam - 7/2023  Dm nephropathy - yes, on arb   Dm neuropathy - yes  Episodes of hypoglycemia - nothing below 90  On statin and vascepa     Objective   Vital Signs:  /70   Pulse 102   Temp 97 °F (36.1 °C)   Ht 182.9 cm (72.01\")   Wt 85.8 kg (189 lb 3.2 oz)   SpO2 97%   BMI 25.65 kg/m²   Estimated body mass index is 25.65 kg/m² as calculated from the following:    Height as of this encounter: 182.9 cm (72.01\").    Weight as of this encounter: 85.8 kg (189 lb 3.2 oz).             Physical Exam  Vitals reviewed.   Constitutional:       General: He is not in acute distress.  HENT:      Head: Normocephalic and atraumatic.   Cardiovascular:      Rate and Rhythm: Normal rate.   Pulmonary:      Effort: Pulmonary effort is normal. No respiratory distress.   Musculoskeletal:         General: No signs of injury. Normal range of motion.      Cervical back: Normal range of motion and neck supple.   Skin:     General: Skin is warm and dry.   Neurological:      Mental Status: He is alert and oriented to person, place, and time. Mental status is at baseline.   Psychiatric:         Mood and Affect: Mood normal.         Behavior: Behavior normal.         Thought Content: Thought content normal.         Judgment: Judgment normal.      Result Review :  The following data was reviewed by: EDVIN Sierra on 07/26/2023:  Common labs          9/30/2022    08:52 10/28/2022    10:09 3/14/2023    09:02   Common Labs   Glucose 147   150    BUN 33   24    Creatinine 1.50   1.29  "   Sodium 140   144    Potassium 4.4   4.7    Chloride 103   105    Calcium 10.0   10.1    Total Cholesterol   151    Triglycerides   97    HDL Cholesterol   51    LDL Cholesterol    82    Hemoglobin A1C  7.20  7.20                   Assessment and Plan   Diagnoses and all orders for this visit:    1. Type 2 diabetes mellitus with hyperglycemia, without long-term current use of insulin (Primary)  -     nateglinide (STARLIX) 120 MG tablet; Take 1 tablet by mouth 3 (Three) Times a Day.  Dispense: 270 tablet; Refill: 1  -     pioglitazone (Actos) 30 MG tablet; Take 1 tablet by mouth Daily.  Dispense: 90 tablet; Refill: 1  -     dapagliflozin-metformin HCl ER (Xigduo XR) 5-1000 MG tablet; Take 1 tablet by mouth 2 (Two) Times a Day for 90 days.  Dispense: 180 tablet; Refill: 1  -     Hemoglobin A1c  -     Basic Metabolic Panel    2. Mixed hyperlipidemia  -     atorvastatin (LIPITOR) 20 MG tablet; Take 1 tablet by mouth Every Night.  Dispense: 90 tablet; Refill: 1  -     Vascepa 1 g capsule capsule; Take 2 g by mouth 2 (Two) Times a Day With Meals.  Dispense: 360 capsule; Refill: 3    Other orders  -     Dulaglutide 3 MG/0.5ML solution pen-injector; Inject 0.5 mL under the skin into the appropriate area as directed 1 (One) Time Per Week.  Dispense: 6 mL; Refill: 1             Follow Up   Return in about 4 months (around 11/26/2023).    Labs today  Will adjust above regimen as needed based on labs     Patient was given instructions and counseling regarding his condition or for health maintenance advice. Please see specific information pulled into the AVS if appropriate.     Lola Edwards APRN

## 2023-07-27 LAB
BUN SERPL-MCNC: 31 MG/DL (ref 8–23)
BUN/CREAT SERPL: 23.1 (ref 7–25)
CALCIUM SERPL-MCNC: 9.6 MG/DL (ref 8.6–10.5)
CHLORIDE SERPL-SCNC: 103 MMOL/L (ref 98–107)
CO2 SERPL-SCNC: 23 MMOL/L (ref 22–29)
CREAT SERPL-MCNC: 1.34 MG/DL (ref 0.76–1.27)
EGFRCR SERPLBLD CKD-EPI 2021: 55.9 ML/MIN/1.73
GLUCOSE SERPL-MCNC: 121 MG/DL (ref 65–99)
HBA1C MFR BLD: 7.2 % (ref 4.8–5.6)
POTASSIUM SERPL-SCNC: 4.5 MMOL/L (ref 3.5–5.2)
SODIUM SERPL-SCNC: 141 MMOL/L (ref 136–145)

## 2023-08-02 ENCOUNTER — TELEPHONE (OUTPATIENT)
Dept: ENDOCRINOLOGY | Age: 73
End: 2023-08-02
Payer: MEDICARE

## 2023-09-01 DIAGNOSIS — E11.65 TYPE 2 DIABETES MELLITUS WITH HYPERGLYCEMIA, WITHOUT LONG-TERM CURRENT USE OF INSULIN: ICD-10-CM

## 2023-09-01 RX ORDER — DAPAGLIFLOZIN AND METFORMIN HYDROCHLORIDE 5; 1000 MG/1; MG/1
TABLET, FILM COATED, EXTENDED RELEASE ORAL
Qty: 180 TABLET | Refills: 0 | Status: SHIPPED | OUTPATIENT
Start: 2023-09-01

## 2023-10-18 DIAGNOSIS — E11.65 TYPE 2 DIABETES MELLITUS WITH HYPERGLYCEMIA, WITHOUT LONG-TERM CURRENT USE OF INSULIN: ICD-10-CM

## 2023-10-18 RX ORDER — PIOGLITAZONEHYDROCHLORIDE 30 MG/1
30 TABLET ORAL DAILY
Qty: 90 TABLET | Refills: 0 | Status: SHIPPED | OUTPATIENT
Start: 2023-10-18 | End: 2024-10-17

## 2023-10-18 NOTE — TELEPHONE ENCOUNTER
Specialty Pharmacy Patient Management Program  Prescription Refill Request     Patient currently fills medications at  Pharmacy. Needing refill(s) on the following:      Requested Prescriptions     Pending Prescriptions Disp Refills    pioglitazone (Actos) 30 MG tablet 90 tablet 0     Sig: Take 1 tablet by mouth Daily.       Pended for EDVIN Sierra to review, and approve if appropriate.     Treva eLa, PharmD, BCPS, BCCCP  10/18/2023 14:50 EDT

## 2023-11-16 ENCOUNTER — OFFICE VISIT (OUTPATIENT)
Dept: ENDOCRINOLOGY | Age: 73
End: 2023-11-16
Payer: MEDICARE

## 2023-11-16 VITALS
DIASTOLIC BLOOD PRESSURE: 74 MMHG | OXYGEN SATURATION: 99 % | WEIGHT: 181.2 LBS | HEART RATE: 86 BPM | HEIGHT: 72 IN | SYSTOLIC BLOOD PRESSURE: 130 MMHG | BODY MASS INDEX: 24.54 KG/M2 | TEMPERATURE: 96.4 F

## 2023-11-16 DIAGNOSIS — E78.2 MIXED HYPERLIPIDEMIA: ICD-10-CM

## 2023-11-16 DIAGNOSIS — Z00.00 ROUTINE CHECK-UP: ICD-10-CM

## 2023-11-16 DIAGNOSIS — E11.65 TYPE 2 DIABETES MELLITUS WITH HYPERGLYCEMIA, WITHOUT LONG-TERM CURRENT USE OF INSULIN: Primary | ICD-10-CM

## 2023-11-16 PROCEDURE — 3075F SYST BP GE 130 - 139MM HG: CPT | Performed by: NURSE PRACTITIONER

## 2023-11-16 PROCEDURE — 3051F HG A1C>EQUAL 7.0%<8.0%: CPT | Performed by: NURSE PRACTITIONER

## 2023-11-16 PROCEDURE — 99214 OFFICE O/P EST MOD 30 MIN: CPT | Performed by: NURSE PRACTITIONER

## 2023-11-16 PROCEDURE — 3078F DIAST BP <80 MM HG: CPT | Performed by: NURSE PRACTITIONER

## 2023-11-16 RX ORDER — FLUCONAZOLE 150 MG/1
150 TABLET ORAL ONCE
Qty: 2 TABLET | Refills: 0 | Status: SHIPPED | OUTPATIENT
Start: 2023-11-16 | End: 2023-11-16

## 2023-11-16 RX ORDER — NATEGLINIDE 120 MG/1
120 TABLET ORAL 3 TIMES DAILY
Qty: 270 TABLET | Refills: 1 | Status: SHIPPED | OUTPATIENT
Start: 2023-11-16

## 2023-11-16 RX ORDER — ICOSAPENT ETHYL 1000 MG/1
2 CAPSULE ORAL 2 TIMES DAILY WITH MEALS
Qty: 360 CAPSULE | Refills: 3 | Status: SHIPPED | OUTPATIENT
Start: 2023-11-16

## 2023-11-16 NOTE — PROGRESS NOTES
"Chief Complaint  Diabetes (Checks  blood sugars occasionally. Would like a written rx for nateglinide. )    Subjective        Emmanuel Roach presents to [facility] ENDOCRINOLOGY  History of Present Illness    Patient is uncircumcised, concerned about yeast infection on the foreskin     Type 2 dm    Diagnosed about 10 + years ago.   Today in clinic pt reports being on starlix 120 mg po tid ac, trulicity 3 mg subq once a week, actos 30 mg po daily, xigduo 5-1000mg bid   Last eye exam - 7/2023  (+) nephropathy; on arb  (+) neuropathy  Episodes of hypoglycemia - denies   On statin and vascepa       Objective   Vital Signs:  /74   Pulse 86   Temp 96.4 °F (35.8 °C) (Temporal)   Ht 182.9 cm (72.01\")   Wt 82.2 kg (181 lb 3.2 oz)   SpO2 99%   BMI 24.57 kg/m²   Estimated body mass index is 24.57 kg/m² as calculated from the following:    Height as of this encounter: 182.9 cm (72.01\").    Weight as of this encounter: 82.2 kg (181 lb 3.2 oz).       BMI is within normal parameters. No other follow-up for BMI required.      Physical Exam  Vitals reviewed.   Constitutional:       General: He is not in acute distress.  HENT:      Head: Normocephalic and atraumatic.   Cardiovascular:      Rate and Rhythm: Normal rate.   Pulmonary:      Effort: Pulmonary effort is normal. No respiratory distress.   Musculoskeletal:         General: No signs of injury. Normal range of motion.      Cervical back: Normal range of motion and neck supple.   Skin:     General: Skin is warm and dry.   Neurological:      Mental Status: He is alert and oriented to person, place, and time. Mental status is at baseline.   Psychiatric:         Mood and Affect: Mood normal.         Behavior: Behavior normal.         Thought Content: Thought content normal.         Judgment: Judgment normal.          Result Review :  The following data was reviewed by: EDVIN Sierra on 11/16/2023:  Common labs          3/14/2023    09:02 " 7/26/2023    09:47   Common Labs   Glucose 150  121    BUN 24  31    Creatinine 1.29  1.34    Sodium 144  141    Potassium 4.7  4.5    Chloride 105  103    Calcium 10.1  9.6    Total Cholesterol 151     Triglycerides 97     HDL Cholesterol 51     LDL Cholesterol  82     Hemoglobin A1C 7.20  7.20                   Assessment and Plan   Diagnoses and all orders for this visit:    1. Type 2 diabetes mellitus with hyperglycemia, without long-term current use of insulin (Primary)  -     nateglinide (STARLIX) 120 MG tablet; Take 1 tablet by mouth 3 (Three) Times a Day.  Dispense: 270 tablet; Refill: 1  -     Hemoglobin A1c  -     Comprehensive Metabolic Panel  -     Lipid Panel  -     Microalbumin / Creatinine Urine Ratio - Urine, Clean Catch    2. Mixed hyperlipidemia  -     Vascepa 1 g capsule capsule; Take 2 g by mouth 2 (Two) Times a Day With Meals.  Dispense: 360 capsule; Refill: 3    3. Routine check-up  -     Ambulatory Referral to Dermatology    Other orders  -     fluconazole (Diflucan) 150 MG tablet; Take 1 tablet by mouth 1 (One) Time for 1 dose. Repeat in 10 days if needed  Dispense: 2 tablet; Refill: 0             Follow Up   No follow-ups on file.    Labs today  Reviewed hypoglycemia prevention and treatment plan   Continue statin and vascepa  Will adjust above regimen as needed based on results    Will give diflucan to repeat in 10 days if needed- discussed urinary hygiene to reduce risk of complicated yeast infection on sglt-2, uncircumcised   Will need to consider alternative if persistent     Patient was given instructions and counseling regarding his condition or for health maintenance advice. Please see specific information pulled into the AVS if appropriate.     EDVIN Sierra

## 2023-11-17 LAB
ALBUMIN SERPL-MCNC: 4.7 G/DL (ref 3.8–4.8)
ALBUMIN/CREAT UR: <4 MG/G CREAT (ref 0–29)
ALBUMIN/GLOB SERPL: 1.7 {RATIO} (ref 1.2–2.2)
ALP SERPL-CCNC: 103 IU/L (ref 44–121)
ALT SERPL-CCNC: 22 IU/L (ref 0–44)
AST SERPL-CCNC: 21 IU/L (ref 0–40)
BILIRUB SERPL-MCNC: 0.6 MG/DL (ref 0–1.2)
BUN SERPL-MCNC: 23 MG/DL (ref 8–27)
BUN/CREAT SERPL: 18 (ref 10–24)
CALCIUM SERPL-MCNC: 9.8 MG/DL (ref 8.6–10.2)
CHLORIDE SERPL-SCNC: 103 MMOL/L (ref 96–106)
CHOLEST SERPL-MCNC: 132 MG/DL (ref 100–199)
CO2 SERPL-SCNC: 23 MMOL/L (ref 20–29)
CREAT SERPL-MCNC: 1.25 MG/DL (ref 0.76–1.27)
CREAT UR-MCNC: 74.4 MG/DL
EGFRCR SERPLBLD CKD-EPI 2021: 61 ML/MIN/1.73
GLOBULIN SER CALC-MCNC: 2.7 G/DL (ref 1.5–4.5)
GLUCOSE SERPL-MCNC: 129 MG/DL (ref 70–99)
HBA1C MFR BLD: 7.2 % (ref 4.8–5.6)
HDLC SERPL-MCNC: 55 MG/DL
IMP & REVIEW OF LAB RESULTS: NORMAL
LDLC SERPL CALC-MCNC: 63 MG/DL (ref 0–99)
MICROALBUMIN UR-MCNC: <3 UG/ML
POTASSIUM SERPL-SCNC: 4.4 MMOL/L (ref 3.5–5.2)
PROT SERPL-MCNC: 7.4 G/DL (ref 6–8.5)
SODIUM SERPL-SCNC: 143 MMOL/L (ref 134–144)
TRIGL SERPL-MCNC: 68 MG/DL (ref 0–149)
VLDLC SERPL CALC-MCNC: 14 MG/DL (ref 5–40)

## 2023-11-30 DIAGNOSIS — E11.65 TYPE 2 DIABETES MELLITUS WITH HYPERGLYCEMIA, WITHOUT LONG-TERM CURRENT USE OF INSULIN: ICD-10-CM

## 2023-11-30 RX ORDER — DAPAGLIFLOZIN AND METFORMIN HYDROCHLORIDE 5; 1000 MG/1; MG/1
TABLET, FILM COATED, EXTENDED RELEASE ORAL
Qty: 180 TABLET | Refills: 0 | Status: SHIPPED | OUTPATIENT
Start: 2023-11-30

## 2023-11-30 NOTE — TELEPHONE ENCOUNTER
Rx Refill Note  Requested Prescriptions     Pending Prescriptions Disp Refills    Xigduo XR 5-1000 MG tablet [Pharmacy Med Name: XIGDUO XR TABS 5/1000MG] 180 tablet 3     Sig: TAKE 1 TABLET TWICE A DAY      Last office visit with prescribing clinician: 11/16/2023   Last telemedicine visit with prescribing clinician: Visit date not found   Next office visit with prescribing clinician: 3/18/2024                         Would you like a call back once the refill request has been completed: [] Yes [] No    If the office needs to give you a call back, can they leave a voicemail: [] Yes [] No    Tricia Figueroa  11/30/23, 07:55 EST

## 2023-12-18 ENCOUNTER — TELEPHONE (OUTPATIENT)
Dept: ENDOCRINOLOGY | Age: 73
End: 2023-12-18
Payer: MEDICARE

## 2023-12-18 NOTE — TELEPHONE ENCOUNTER
PT CALLED IN TO [facility] AND SO HE WANTED TO KNOW WHAT ELSE CAN HE TAKE INSTEAD. PLUS HE WANTED TO GET A REFILL OF THE BLOW MED.      fluconazole (Diflucan) 150 MG tablet [[id number]]   ALT MED FOR [facility]        GOING TO:  [facility] - [address] [phone] [facility] [phone] FX

## 2023-12-19 RX ORDER — METFORMIN HYDROCHLORIDE 500 MG/1
1000 TABLET, EXTENDED RELEASE ORAL 2 TIMES DAILY
Qty: 360 TABLET | Refills: 1 | Status: SHIPPED | OUTPATIENT
Start: 2023-12-19 | End: 2024-12-18

## 2023-12-19 NOTE — TELEPHONE ENCOUNTER
The diflucan was a one time RX, it will not be refilled. He needs to be evaluated at this PCP or urgent care to confirm diagnosis since 2 tablets did not help- he needs to stop xigduo since he is still have yeast infections. he will need to take just the metformin and not the metformin/farxiga combo- xigduo    He needs to check with his insurance on which trulicity alternative they will pay for- ozempic or mounjaro

## 2023-12-19 NOTE — TELEPHONE ENCOUNTER
Called and spoke with patient reg diflucan and metformin. I advised him to call insurance company but pt would like for us to just send either ozempic and mounjaro already and go from there.

## 2024-01-12 ENCOUNTER — TELEPHONE (OUTPATIENT)
Dept: ENDOCRINOLOGY | Age: 74
End: 2024-01-12
Payer: MEDICARE

## 2024-01-12 NOTE — TELEPHONE ENCOUNTER
Incoming Refill Request      Medication requested (name and dose): [facility]     Pharmacy where request should be sent:     Additional details provided by patient:     Best call back number: [phone]    Does the patient have less than a 3 day supply:  [] Yes  [] No    Joe Ruffin Rep  01/12/24, 14:40 EST

## 2024-01-16 RX ORDER — DULAGLUTIDE 3 MG/.5ML
3 INJECTION, SOLUTION SUBCUTANEOUS WEEKLY
Qty: 6 ML | Refills: 0 | Status: SHIPPED | OUTPATIENT
Start: 2024-01-16

## 2024-01-16 NOTE — TELEPHONE ENCOUNTER
Pt stated that when he went to  [facility], trulicity was actually in stock. Pt would like to go back to nae

## 2024-01-16 NOTE — TELEPHONE ENCOUNTER
Call patient  Vick was sent last refill as they did not have trulicity at his pharmacy  So does he want to go back on the trulicity or does he want a refill on vick's next dose?

## 2024-02-09 DIAGNOSIS — E78.2 MIXED HYPERLIPIDEMIA: ICD-10-CM

## 2024-02-09 RX ORDER — ATORVASTATIN CALCIUM 20 MG/1
20 TABLET, FILM COATED ORAL NIGHTLY
Qty: 90 TABLET | Refills: 0 | Status: SHIPPED | OUTPATIENT
Start: 2024-02-09

## 2024-02-09 NOTE — TELEPHONE ENCOUNTER
Incoming Refill Request      Medication requested (name and dose): atorvastatin     Pharm[facility] where [facility] be sent[facility]    Additional details provided by patient:     Best call back number:  [phone]     Does the patient have less than a 3 day supply:  [] Yes  [] No    Joe Ruffin Rep  02/09/24, 13:02 EST

## 2024-03-18 ENCOUNTER — OFFICE VISIT (OUTPATIENT)
Dept: ENDOCRINOLOGY | Age: 74
End: 2024-03-18
Payer: MEDICARE

## 2024-03-18 VITALS
TEMPERATURE: 96.6 F | HEART RATE: 82 BPM | BODY MASS INDEX: 24.89 KG/M2 | HEIGHT: 72 IN | WEIGHT: 183.8 LBS | OXYGEN SATURATION: 98 %

## 2024-03-18 DIAGNOSIS — E11.65 TYPE 2 DIABETES MELLITUS WITH HYPERGLYCEMIA, WITHOUT LONG-TERM CURRENT USE OF INSULIN: Primary | ICD-10-CM

## 2024-03-18 DIAGNOSIS — N18.31 STAGE 3A CHRONIC KIDNEY DISEASE: ICD-10-CM

## 2024-03-18 DIAGNOSIS — E78.2 MIXED HYPERLIPIDEMIA: ICD-10-CM

## 2024-03-18 DIAGNOSIS — E11.42 DIABETIC PERIPHERAL NEUROPATHY ASSOCIATED WITH TYPE 2 DIABETES MELLITUS: ICD-10-CM

## 2024-03-18 PROCEDURE — 99214 OFFICE O/P EST MOD 30 MIN: CPT | Performed by: NURSE PRACTITIONER

## 2024-03-18 RX ORDER — DULAGLUTIDE 3 MG/.5ML
3 INJECTION, SOLUTION SUBCUTANEOUS WEEKLY
Qty: 6 ML | Refills: 1 | Status: SHIPPED | OUTPATIENT
Start: 2024-03-18

## 2024-03-18 NOTE — PROGRESS NOTES
"Chief Complaint  Diabetes (Need refill on trulicity. )    Subjective        Emmanuel Roach presents to [facility] ENDOCRINOLOGY  History of Present Illness    Type 2 dm, > 10 years    DM regimen: starlix 120 mg po AC, trulicity 3 mg subq once a week, actos 30 mg po daily, metformin 1000mg bid ( xigduo stopped r/t groin yeast infection)   Last eye exam - 7/2023  (+) nephropathy; on arb  (+) neuropathy  Episodes of hypoglycemia - lowest 69 x 1   On statin and vascepa     Objective   Vital Signs:  Pulse 82   Temp 96.6 °F (35.9 °C) (Temporal)   Ht 182.9 cm (72.01\")   Wt 83.4 kg (183 lb 12.8 oz)   SpO2 98%   BMI 24.92 kg/m²   Estimated body mass index is 24.92 kg/m² as calculated from the following:    Height as of this encounter: 182.9 cm (72.01\").    Weight as of this encounter: 83.4 kg (183 lb 12.8 oz).       BMI is within normal parameters. No other follow-up for BMI required.      Physical Exam  Vitals reviewed.   Constitutional:       General: He is not in acute distress.  HENT:      Head: Normocephalic and atraumatic.   Cardiovascular:      Rate and Rhythm: Normal rate.   Pulmonary:      Effort: Pulmonary effort is normal. No respiratory distress.   Musculoskeletal:         General: No signs of injury. Normal range of motion.      Cervical back: Normal range of motion and neck supple.   Skin:     General: Skin is warm and dry.   Neurological:      Mental Status: He is alert and oriented to person, place, and time. Mental status is at baseline.   Psychiatric:         Mood and Affect: Mood normal.         Behavior: Behavior normal.         Thought Content: Thought content normal.         Judgment: Judgment normal.        Result Review :    The following data was reviewed by: EDVIN Sierra on 03/18/2024:  Common labs          7/26/2023    09:47 11/16/2023    11:48 1/17/2024    11:10   Common Labs   Glucose 121  129     BUN 31  23     Creatinine 1.34  1.25     Sodium 141  143   "   Potassium 4.5  4.4     Chloride 103  103     Calcium 9.6  9.8     Total Protein  7.4     Albumin  4.7     Total Bilirubin  0.6     Alkaline Phosphatase  103     AST (SGOT)  21     ALT (SGPT)  22     WBC   5.63       Hemoglobin   15.4       Hematocrit   46.5       Platelets   194       Total Cholesterol  132     Triglycerides  68     HDL Cholesterol  55     LDL Cholesterol   63     Hemoglobin A1C 7.20  7.2  7.9       Microalbumin, Urine  <3.0     PSA   0.07          Details          This result is from an external source.                          Assessment and Plan     Diagnoses and all orders for this visit:    1. Type 2 diabetes mellitus with hyperglycemia, without long-term current use of insulin (Primary)  -     Hemoglobin A1c  -     Basic Metabolic Panel    2. Mixed hyperlipidemia    3. Stage 3a chronic kidney disease    4. Diabetic peripheral neuropathy associated with type 2 diabetes mellitus    Other orders  -     Dulaglutide (Trulicity) 3 MG/0.5ML solution pen-injector; Inject 0.5 mL under the skin into the appropriate area as directed 1 (One) Time Per Week.  Dispense: 6 mL; Refill: 1             Follow Up     Return in about 4 months (around 7/18/2024).    Labs today  Will adjust regimen as needed based on results off farxiga  Continue statin and vascepa     Patient was given instructions and counseling regarding his condition or for health maintenance advice. Please see specific information pulled into the AVS if appropriate.     EDVIN Sierra

## 2024-03-19 LAB
BUN SERPL-MCNC: 20 MG/DL (ref 8–23)
BUN/CREAT SERPL: 20.4 (ref 7–25)
CALCIUM SERPL-MCNC: 9.4 MG/DL (ref 8.6–10.5)
CHLORIDE SERPL-SCNC: 107 MMOL/L (ref 98–107)
CO2 SERPL-SCNC: 23.5 MMOL/L (ref 22–29)
CREAT SERPL-MCNC: 0.98 MG/DL (ref 0.76–1.27)
EGFRCR SERPLBLD CKD-EPI 2021: 80.9 ML/MIN/1.73
GLUCOSE SERPL-MCNC: 140 MG/DL (ref 65–99)
HBA1C MFR BLD: 7.6 % (ref 4.8–5.6)
POTASSIUM SERPL-SCNC: 4.7 MMOL/L (ref 3.5–5.2)
SODIUM SERPL-SCNC: 145 MMOL/L (ref 136–145)

## 2024-04-11 DIAGNOSIS — E11.65 TYPE 2 DIABETES MELLITUS WITH HYPERGLYCEMIA, WITHOUT LONG-TERM CURRENT USE OF INSULIN: ICD-10-CM

## 2024-04-11 RX ORDER — PIOGLITAZONEHYDROCHLORIDE 30 MG/1
30 TABLET ORAL DAILY
Qty: 90 TABLET | Refills: 0 | Status: SHIPPED | OUTPATIENT
Start: 2024-04-11 | End: 2025-04-11

## 2024-05-09 ENCOUNTER — TELEPHONE (OUTPATIENT)
Dept: ENDOCRINOLOGY | Age: 74
End: 2024-05-09
Payer: MEDICARE

## 2024-05-09 DIAGNOSIS — E78.2 MIXED HYPERLIPIDEMIA: ICD-10-CM

## 2024-05-09 RX ORDER — ATORVASTATIN CALCIUM 20 MG/1
20 TABLET, FILM COATED ORAL NIGHTLY
Qty: 90 TABLET | Refills: 1 | Status: SHIPPED | OUTPATIENT
Start: 2024-05-09

## 2024-06-06 RX ORDER — METFORMIN HYDROCHLORIDE 500 MG/1
1000 TABLET, EXTENDED RELEASE ORAL 2 TIMES DAILY
Qty: 360 TABLET | Refills: 0 | Status: SHIPPED | OUTPATIENT
Start: 2024-06-06 | End: 2025-06-06

## 2024-06-06 NOTE — TELEPHONE ENCOUNTER
Patient called asking if we can send a refill of Metformin to the pharmacy in [place].

## 2024-06-06 NOTE — TELEPHONE ENCOUNTER
Rx Refill Note  Requested Prescriptions     Pending Prescriptions Disp Refills    metFORMIN ER (GLUCOPHAGE-XR) 500 MG 24 hr tablet 360 tablet 1     Sig: Take 2 tablets by mouth 2 (Two) Times a Day.      Last office visit with prescribing clinician: 3/18/2024   Last telemedicine visit with prescribing clinician: Visit date not found   Next office visit with prescribing clinician: 7/24/2024                         Would you like a call back once the refill request has been completed: [] Yes [] No    If the office needs to give you a call back, can they leave a voicemail: [] Yes [] No    Tricia Figueroa  06/06/24, 10:07 EDT

## 2024-06-20 ENCOUNTER — TELEPHONE (OUTPATIENT)
Dept: ENDOCRINOLOGY | Age: 74
End: 2024-06-20
Payer: MEDICARE

## 2024-06-20 RX ORDER — DULAGLUTIDE 1.5 MG/.5ML
1.5 INJECTION, SOLUTION SUBCUTANEOUS WEEKLY
Qty: 2 ML | Refills: 3 | Status: SHIPPED | OUTPATIENT
Start: 2024-06-20

## 2024-06-20 NOTE — TELEPHONE ENCOUNTER
PT CALLED IN AND STATED THAT [facility] WAS OUT OF THE BELOW MED DOSE AND WANTED TO KNOW WHAT HE SHOULD DO. HE STATED THEY HAD THE LOWER AND HIGHER DOSES IN STOCK. SO DIDN'T KNOW IF ECHO WANTED TO CHANGE HIS DOSE OR SEND IN ANOTHER TYPE OF MED.      Dulaglutide (Trulicity) 3 MG/0.5ML solution pen-injector [[id number]]       PLEASE CALL PT WITH WHAT TO DO.  VERONIKA MARIN [phone]

## 2024-06-20 NOTE — TELEPHONE ENCOUNTER
Called and spoke with pt. Informed pt of Lola's message. Pt stated he would like to go with a lower dose of the Trulicity. Informed pt that I would let Lola know. Pt requested he be contacted once Rx is sent.

## 2024-06-20 NOTE — TELEPHONE ENCOUNTER
Called and spoke with pt's wife. Informed pt's wife the Rx was sent. Pt's wife voiced understanding and had no further questions or concerns.

## 2024-07-24 ENCOUNTER — OFFICE VISIT (OUTPATIENT)
Dept: ENDOCRINOLOGY | Age: 74
End: 2024-07-24
Payer: MEDICARE

## 2024-07-24 VITALS
WEIGHT: 193.4 LBS | OXYGEN SATURATION: 97 % | BODY MASS INDEX: 26.19 KG/M2 | HEART RATE: 76 BPM | SYSTOLIC BLOOD PRESSURE: 118 MMHG | HEIGHT: 72 IN | TEMPERATURE: 97.9 F | DIASTOLIC BLOOD PRESSURE: 70 MMHG

## 2024-07-24 DIAGNOSIS — E11.65 TYPE 2 DIABETES MELLITUS WITH HYPERGLYCEMIA, WITHOUT LONG-TERM CURRENT USE OF INSULIN: Primary | ICD-10-CM

## 2024-07-24 DIAGNOSIS — E78.2 MIXED HYPERLIPIDEMIA: ICD-10-CM

## 2024-07-24 DIAGNOSIS — E11.42 DIABETIC PERIPHERAL NEUROPATHY ASSOCIATED WITH TYPE 2 DIABETES MELLITUS: ICD-10-CM

## 2024-07-24 PROCEDURE — 3051F HG A1C>EQUAL 7.0%<8.0%: CPT | Performed by: NURSE PRACTITIONER

## 2024-07-24 PROCEDURE — 3074F SYST BP LT 130 MM HG: CPT | Performed by: NURSE PRACTITIONER

## 2024-07-24 PROCEDURE — 99214 OFFICE O/P EST MOD 30 MIN: CPT | Performed by: NURSE PRACTITIONER

## 2024-07-24 PROCEDURE — 3078F DIAST BP <80 MM HG: CPT | Performed by: NURSE PRACTITIONER

## 2024-07-24 RX ORDER — METFORMIN HYDROCHLORIDE 500 MG/1
1000 TABLET, EXTENDED RELEASE ORAL 2 TIMES DAILY
Qty: 360 TABLET | Refills: 1 | Status: SHIPPED | OUTPATIENT
Start: 2024-07-24 | End: 2025-07-24

## 2024-07-24 RX ORDER — ICOSAPENT ETHYL 1000 MG/1
2 CAPSULE ORAL 2 TIMES DAILY WITH MEALS
Qty: 360 CAPSULE | Refills: 3 | Status: SHIPPED | OUTPATIENT
Start: 2024-07-24

## 2024-07-24 RX ORDER — NATEGLINIDE 120 MG/1
120 TABLET ORAL 3 TIMES DAILY
Qty: 270 TABLET | Refills: 1 | Status: SHIPPED | OUTPATIENT
Start: 2024-07-24

## 2024-07-24 RX ORDER — NATEGLINIDE 120 MG/1
120 TABLET ORAL 3 TIMES DAILY
Qty: 270 TABLET | Refills: 1 | Status: SHIPPED | OUTPATIENT
Start: 2024-07-24 | End: 2024-07-24

## 2024-07-24 RX ORDER — PIOGLITAZONEHYDROCHLORIDE 30 MG/1
30 TABLET ORAL DAILY
Qty: 90 TABLET | Refills: 1 | Status: SHIPPED | OUTPATIENT
Start: 2024-07-24 | End: 2025-07-24

## 2024-07-24 RX ORDER — ATORVASTATIN CALCIUM 20 MG/1
20 TABLET, FILM COATED ORAL NIGHTLY
Qty: 90 TABLET | Refills: 1 | Status: SHIPPED | OUTPATIENT
Start: 2024-07-24

## 2024-07-24 NOTE — PROGRESS NOTES
"Chief Complaint  Diabetes    Subjective        Emmanuel Roach presents to [facility] ENDOCRINOLOGY  History of Present Illness    Type 2 dm, > 10 years    DM regimen: starlix 120 mg po AC, trulicity 1.5mg -3 mg weekly depending on availability, actos 30 mg po daily, metformin 1000mg bid ( xigduo stopped r/t groin yeast infection)   BS this AM ~ 150, usually 140s   Last eye exam - 7/2023, time to arrange f/u   Known complications: neuropathy   Renal: losartan   Episodes of hypoglycemia: no reports below 80  CV: statin and vascepa        Objective   Vital Signs:  /70   Pulse 76   Temp 97.9 °F (36.6 °C) (Oral)   Ht 182.9 cm (72.01\")   Wt 87.7 kg (193 lb 6.4 oz)   SpO2 97%   BMI 26.22 kg/m²   Estimated body mass index is 26.22 kg/m² as calculated from the following:    Height as of this encounter: 182.9 cm (72.01\").    Weight as of this encounter: 87.7 kg (193 lb 6.4 oz).             Physical Exam  Vitals reviewed.   Constitutional:       General: He is not in acute distress.  HENT:      Head: Normocephalic and atraumatic.   Cardiovascular:      Rate and Rhythm: Normal rate.   Pulmonary:      Effort: Pulmonary effort is normal. No respiratory distress.   Musculoskeletal:         General: No signs of injury. Normal range of motion.      Cervical back: Normal range of motion and neck supple.   Skin:     General: Skin is warm and dry.   Neurological:      Mental Status: He is alert and oriented to person, place, and time. Mental status is at baseline.   Psychiatric:         Mood and Affect: Mood normal.         Behavior: Behavior normal.         Thought Content: Thought content normal.         Judgment: Judgment normal.            Result Review :    The following data was reviewed by: EDVIN Sierra on 07/24/2024:  Common labs          11/16/2023    11:48 1/17/2024    11:10 3/18/2024    12:02   Common Labs   Glucose 129   140    BUN 23   20    Creatinine 1.25   0.98    Sodium 143   " 145    Potassium 4.4   4.7    Chloride 103   107    Calcium 9.8   9.4    Total Protein 7.4      Albumin 4.7      Total Bilirubin 0.6      Alkaline Phosphatase 103      AST (SGOT) 21      ALT (SGPT) 22      WBC  5.63        Hemoglobin  15.4        Hematocrit  46.5        Platelets  194        Total Cholesterol 132      Triglycerides 68      HDL Cholesterol 55      LDL Cholesterol  63      Hemoglobin A1C 7.2  7.9     7.60    Microalbumin, Urine <3.0      PSA  0.07           Details          This result is from an external source.                          Assessment and Plan     Diagnoses and all orders for this visit:    1. Type 2 diabetes mellitus with hyperglycemia, without long-term current use of insulin (Primary)  -     pioglitazone (Actos) 30 MG tablet; Take 1 tablet by mouth Daily.  Dispense: 90 tablet; Refill: 1  -     Discontinue: nateglinide (STARLIX) 120 MG tablet; Take 1 tablet by mouth 3 (Three) Times a Day.  Dispense: 270 tablet; Refill: 1  -     nateglinide (STARLIX) 120 MG tablet; Take 1 tablet by mouth 3 (Three) Times a Day.  Dispense: 270 tablet; Refill: 1  -     Hemoglobin A1c  -     Microalbumin / Creatinine Urine Ratio - Urine, Clean Catch  -     TSH    2. Mixed hyperlipidemia  -     atorvastatin (LIPITOR) 20 MG tablet; Take 1 tablet by mouth Every Night.  Dispense: 90 tablet; Refill: 1  -     Vascepa 1 g capsule capsule; Take 2 g by mouth 2 (Two) Times a Day With Meals.  Dispense: 360 capsule; Refill: 3    3. Diabetic peripheral neuropathy associated with type 2 diabetes mellitus    Other orders  -     metFORMIN ER (GLUCOPHAGE-XR) 500 MG 24 hr tablet; Take 2 tablets by mouth 2 (Two) Times a Day.  Dispense: 360 tablet; Refill: 1             Follow Up     Return in about 4 months (around 11/24/2024).    Labs today  Further recommendations based on results  Resume trulicity 3mg when available   Continue statin and arb     Patient was given instructions and counseling regarding his condition or for  health maintenance advice. Please see specific information pulled into the AVS if appropriate.     EDVIN Sierra

## 2024-07-25 ENCOUNTER — TELEPHONE (OUTPATIENT)
Dept: ENDOCRINOLOGY | Age: 74
End: 2024-07-25
Payer: MEDICARE

## 2024-07-25 LAB
ALBUMIN/CREAT UR: 29 MG/G CREAT (ref 0–29)
CREAT UR-MCNC: 185.1 MG/DL
HBA1C MFR BLD: 7.6 % (ref 4.8–5.6)
MICROALBUMIN UR-MCNC: 54.2 UG/ML
TSH SERPL DL<=0.005 MIU/L-ACNC: 1.59 UIU/ML (ref 0.45–4.5)

## 2024-07-25 NOTE — TELEPHONE ENCOUNTER
Left pt a message to return call.    OKAY FOR HUB AND  TO READ RESULTS.      Labs stable, no additional recommendations at this time

## 2024-10-31 ENCOUNTER — TELEPHONE (OUTPATIENT)
Dept: ENDOCRINOLOGY | Age: 74
End: 2024-10-31
Payer: MEDICARE

## 2024-10-31 NOTE — TELEPHONE ENCOUNTER
Incoming Refill Request      Medication requested (name and dose): [facility]     Pharmacy where [facility] be sent[facility] [place]     Additional details provided by patient:     Best call back number: [phone]    Does the patient have less than a 3 day supply:  [] Yes  [] No    Joe Ruffin Rep  10/31/24, 14:21 EDT

## 2024-12-26 ENCOUNTER — OFFICE VISIT (OUTPATIENT)
Dept: ENDOCRINOLOGY | Age: 74
End: 2024-12-26
Payer: MEDICARE

## 2024-12-26 VITALS
HEART RATE: 79 BPM | TEMPERATURE: 98.1 F | WEIGHT: 196.2 LBS | BODY MASS INDEX: 26.57 KG/M2 | SYSTOLIC BLOOD PRESSURE: 142 MMHG | HEIGHT: 72 IN | OXYGEN SATURATION: 96 % | DIASTOLIC BLOOD PRESSURE: 80 MMHG

## 2024-12-26 DIAGNOSIS — E11.42 DIABETIC PERIPHERAL NEUROPATHY ASSOCIATED WITH TYPE 2 DIABETES MELLITUS: ICD-10-CM

## 2024-12-26 DIAGNOSIS — N18.31 STAGE 3A CHRONIC KIDNEY DISEASE: ICD-10-CM

## 2024-12-26 DIAGNOSIS — E78.2 MIXED HYPERLIPIDEMIA: ICD-10-CM

## 2024-12-26 DIAGNOSIS — E11.65 TYPE 2 DIABETES MELLITUS WITH HYPERGLYCEMIA, WITHOUT LONG-TERM CURRENT USE OF INSULIN: Primary | ICD-10-CM

## 2024-12-26 LAB
ALBUMIN SERPL-MCNC: 4.5 G/DL (ref 3.5–5.2)
ALBUMIN/GLOB SERPL: 1.5 G/DL
ALP SERPL-CCNC: 103 U/L (ref 39–117)
ALT SERPL-CCNC: 27 U/L (ref 1–41)
AST SERPL-CCNC: 16 U/L (ref 1–40)
BILIRUB SERPL-MCNC: 0.4 MG/DL (ref 0–1.2)
BUN SERPL-MCNC: 27 MG/DL (ref 8–23)
BUN/CREAT SERPL: 20.9 (ref 7–25)
CALCIUM SERPL-MCNC: 9.2 MG/DL (ref 8.6–10.5)
CHLORIDE SERPL-SCNC: 104 MMOL/L (ref 98–107)
CO2 SERPL-SCNC: 26.4 MMOL/L (ref 22–29)
CREAT SERPL-MCNC: 1.29 MG/DL (ref 0.76–1.27)
EGFRCR SERPLBLD CKD-EPI 2021: 58.2 ML/MIN/1.73
GLOBULIN SER CALC-MCNC: 3 GM/DL
GLUCOSE SERPL-MCNC: 135 MG/DL (ref 65–99)
HBA1C MFR BLD: 7.5 % (ref 4.8–5.6)
POTASSIUM SERPL-SCNC: 4.6 MMOL/L (ref 3.5–5.2)
PROT SERPL-MCNC: 7.5 G/DL (ref 6–8.5)
SODIUM SERPL-SCNC: 141 MMOL/L (ref 136–145)

## 2024-12-26 NOTE — PROGRESS NOTES
"Chief Complaint  Diabetes    Subjective        Emmanuel Roach presents to [facility] ENDOCRINOLOGY  History of Present Illness    Type 2 dm, > 10 years    DM regimen: starlix 120 mg po AC, trulicity 3mg weekly, actos 30 mg po daily, metformin 1000mg bid   Tries to check his BS QAM: 130s-140s  Hypoglycemia: denies   Last eye exam: Summer 2024  Known complications: neuropathy   Renal: losartan   Episodes of hypoglycemia: no reports below 80  CV: atorvastatin 20mg QD and vascepa 2g BID     Objective   Vital Signs:  /80   Pulse 79   Temp 98.1 °F (36.7 °C) (Oral)   Ht 182.9 cm (72.01\")   Wt 89 kg (196 lb 3.2 oz)   SpO2 96%   BMI 26.60 kg/m²   Estimated body mass index is 26.6 kg/m² as calculated from the following:    Height as of this encounter: 182.9 cm (72.01\").    Weight as of this encounter: 89 kg (196 lb 3.2 oz).          Physical Exam  Vitals reviewed.   Constitutional:       General: He is not in acute distress.  HENT:      Head: Normocephalic and atraumatic.   Cardiovascular:      Rate and Rhythm: Normal rate.   Pulmonary:      Effort: Pulmonary effort is normal. No respiratory distress.   Musculoskeletal:         General: No signs of injury. Normal range of motion.      Cervical back: Normal range of motion and neck supple.   Skin:     General: Skin is warm and dry.   Neurological:      Mental Status: He is alert and oriented to person, place, and time. Mental status is at baseline.   Psychiatric:         Mood and Affect: Mood normal.         Behavior: Behavior normal.         Thought Content: Thought content normal.         Judgment: Judgment normal.        Result Review :  The following data was reviewed by: EDVIN Sierra on 12/26/2024:  Common labs          1/17/2024    11:10 3/18/2024    12:02 7/24/2024    12:16   Common Labs   Glucose  140     BUN  20     Creatinine  0.98     Sodium  145     Potassium  4.7     Chloride  107     Calcium  9.4     WBC 5.63       "   Hemoglobin 15.4         Hematocrit 46.5         Platelets 194         Hemoglobin A1C 7.9     7.60  7.6    Microalbumin, Urine   54.2    PSA 0.07            Details          This result is from an external source.                       Assessment and Plan   Diagnoses and all orders for this visit:    1. Type 2 diabetes mellitus with hyperglycemia, without long-term current use of insulin (Primary)  -     Hemoglobin A1c  -     Comprehensive Metabolic Panel    2. Diabetic peripheral neuropathy associated with type 2 diabetes mellitus    3. Mixed hyperlipidemia    4. Stage 3a chronic kidney disease    Other orders  -     Dulaglutide 3 MG/0.5ML solution auto-injector; Inject 3 mg under the skin into the appropriate area as directed 1 (One) Time Per Week.  Dispense: 6 mL; Refill: 0             Follow Up   Return in about 6 months (around 6/26/2025).    Labs today  Continue diabetic regimen as above  Additional recommendations to follow as needed based on lab results     Patient was given instructions and counseling regarding his condition or for health maintenance advice. Please see specific information pulled into the AVS if appropriate.     EDVIN Sierra

## 2024-12-31 ENCOUNTER — TELEPHONE (OUTPATIENT)
Dept: ENDOCRINOLOGY | Age: 74
End: 2024-12-31

## 2025-01-09 ENCOUNTER — TELEPHONE (OUTPATIENT)
Dept: ENDOCRINOLOGY | Age: 75
End: 2025-01-09
Payer: MEDICARE

## 2025-01-09 DIAGNOSIS — E78.2 MIXED HYPERLIPIDEMIA: ICD-10-CM

## 2025-01-09 DIAGNOSIS — E11.65 TYPE 2 DIABETES MELLITUS WITH HYPERGLYCEMIA, WITHOUT LONG-TERM CURRENT USE OF INSULIN: ICD-10-CM

## 2025-01-09 RX ORDER — ATORVASTATIN CALCIUM 20 MG/1
20 TABLET, FILM COATED ORAL NIGHTLY
Qty: 90 TABLET | Refills: 1 | Status: SHIPPED | OUTPATIENT
Start: 2025-01-09

## 2025-01-09 RX ORDER — PIOGLITAZONE 30 MG/1
30 TABLET ORAL DAILY
Qty: 90 TABLET | Refills: 1 | Status: SHIPPED | OUTPATIENT
Start: 2025-01-09 | End: 2026-01-09

## 2025-01-09 NOTE — TELEPHONE ENCOUNTER
PT CALLED FOR A MED REFILL REQUEST FOR THE BELOW MEDS.      atorvastatin (LIPITOR) 20 MG tablet [41104]     pioglitazone (Actos) 30 MG tablet [[id number]]         GOING TO:    [facility] - [address] [phone] [facility] [phone]

## 2025-01-09 NOTE — TELEPHONE ENCOUNTER
Rx Refill Note  Requested Prescriptions     Pending Prescriptions Disp Refills    atorvastatin (LIPITOR) 20 MG tablet 90 tablet 1     Sig: Take 1 tablet by mouth Every Night.    pioglitazone (Actos) 30 MG tablet 90 tablet 1     Sig: Take 1 tablet by mouth Daily.      Last office visit with prescribing clinician: 12/26/2024   Last telemedicine visit with prescribing clinician: Visit date not found   Next office visit with prescribing clinician: 6/26/2025                         Would you like a call back once the refill request has been completed: [] Yes [] No    If the office needs to give you a call back, can they leave a voicemail: [] Yes [] No    Tricia Figueroa  01/09/25, 09:36 EST

## 2025-02-18 DIAGNOSIS — E11.65 TYPE 2 DIABETES MELLITUS WITH HYPERGLYCEMIA, WITHOUT LONG-TERM CURRENT USE OF INSULIN: ICD-10-CM

## 2025-02-18 RX ORDER — NATEGLINIDE 120 MG/1
120 TABLET ORAL 3 TIMES DAILY
Qty: 270 TABLET | Refills: 1 | Status: SHIPPED | OUTPATIENT
Start: 2025-02-18

## 2025-02-18 NOTE — TELEPHONE ENCOUNTER
Caller: Emmanuel Roach    Relationship: Self    Best call back number: [phone]    Requested Prescriptions:   Requested Prescriptions     Pending Prescriptions Disp Refills    nateglinide (STARLIX) 120 MG tablet 270 tablet 1     Sig: Take 1 tablet by mouth 3 (Three) Times a Day.        Pharmacy where request should be sent:  [address] [phone] [facility] [phone]      Last office visit with prescribing clinician: 12/26/2024   Last telemedicine visit with prescribing clinician: Visit date not found   Next office visit with prescribing clinician: 6/26/2025     Additional details provided by patient: PT IS ALMOST OUT    Does the patient have less than a 3 day supply:  [] Yes  [] No    Would you like a call back once the refill request has been completed: [] Yes [] No    If the office needs to give you a call back, can they leave a voicemail: [] Yes [] No    Joe Rouse Rep   02/18/25 10:32 EST

## 2025-03-13 RX ORDER — METFORMIN HYDROCHLORIDE 500 MG/1
1000 TABLET, EXTENDED RELEASE ORAL 2 TIMES DAILY
Qty: 360 TABLET | Refills: 0 | Status: SHIPPED | OUTPATIENT
Start: 2025-03-13 | End: 2026-03-13

## 2025-03-13 NOTE — TELEPHONE ENCOUNTER
Rx Refill Note  Requested Prescriptions     Pending Prescriptions Disp Refills    metFORMIN ER (GLUCOPHAGE-XR) 500 MG 24 hr tablet 360 tablet 1     Sig: Take 2 tablets by mouth 2 (Two) Times a Day.      Last office visit with prescribing clinician: 12/26/2024   Last telemedicine visit with prescribing clinician: Visit date not found   Next office visit with prescribing clinician: 6/26/2025                         Would you like a call back once the refill request has been completed: [] Yes [] No    If the office needs to give you a call back, can they leave a voicemail: [] Yes [] No    Keila Murillo MA  03/13/25, 09:58 EDT

## 2025-04-22 NOTE — TELEPHONE ENCOUNTER
Rx Refill Note  Requested Prescriptions     Pending Prescriptions Disp Refills    Dulaglutide 3 MG/0.5ML solution auto-injector 6 mL 0     Sig: Inject 3 mg under the skin into the appropriate area as directed 1 (One) Time Per Week.      Last office visit with prescribing clinician: 12/26/2024   Last telemedicine visit with prescribing clinician: Visit date not found   Next office visit with prescribing clinician: 6/26/2025                         Would you like a call back once the refill request has been completed: [] Yes [] No    If the office needs to give you a call back, can they leave a voicemail: [] Yes [] No    Keila Murillo MA  04/22/25, 14:25 EDT     [facility] pharmacy said they are broke

## 2025-06-09 RX ORDER — METFORMIN HYDROCHLORIDE 500 MG/1
1000 TABLET, EXTENDED RELEASE ORAL 2 TIMES DAILY
Qty: 120 TABLET | Refills: 0 | Status: SHIPPED | OUTPATIENT
Start: 2025-06-09 | End: 2026-06-09

## 2025-06-09 NOTE — TELEPHONE ENCOUNTER
Rx Refill Note  Requested Prescriptions     Pending Prescriptions Disp Refills    metFORMIN ER (GLUCOPHAGE-XR) 500 MG 24 hr tablet 360 tablet 0     Sig: Take 2 tablets by mouth 2 (Two) Times a Day.      Last office visit with prescribing clinician: 12/26/2024   Last telemedicine visit with prescribing clinician: Visit date not found   Next office visit with prescribing clinician: 6/26/2025                         Would you like a call back once the refill request has been completed: [] Yes [] No    If the office needs to give you a call back, can they leave a voicemail: [] Yes [] No    Keila Murillo MA  06/09/25, 12:44 EDT

## 2025-06-26 ENCOUNTER — OFFICE VISIT (OUTPATIENT)
Dept: ENDOCRINOLOGY | Age: 75
End: 2025-06-26
Payer: MEDICARE

## 2025-06-26 VITALS
HEART RATE: 81 BPM | BODY MASS INDEX: 26.22 KG/M2 | HEIGHT: 72 IN | DIASTOLIC BLOOD PRESSURE: 62 MMHG | OXYGEN SATURATION: 97 % | WEIGHT: 193.6 LBS | SYSTOLIC BLOOD PRESSURE: 116 MMHG | TEMPERATURE: 98.1 F

## 2025-06-26 DIAGNOSIS — E78.2 MIXED HYPERLIPIDEMIA: ICD-10-CM

## 2025-06-26 DIAGNOSIS — N18.31 STAGE 3A CHRONIC KIDNEY DISEASE: ICD-10-CM

## 2025-06-26 DIAGNOSIS — E11.65 TYPE 2 DIABETES MELLITUS WITH HYPERGLYCEMIA, WITHOUT LONG-TERM CURRENT USE OF INSULIN: Primary | ICD-10-CM

## 2025-06-26 DIAGNOSIS — E11.42 DIABETIC PERIPHERAL NEUROPATHY ASSOCIATED WITH TYPE 2 DIABETES MELLITUS: ICD-10-CM

## 2025-06-26 RX ORDER — BLOOD-GLUCOSE METER
KIT MISCELLANEOUS
Qty: 300 EACH | Refills: 3 | Status: SHIPPED | OUTPATIENT
Start: 2025-06-26

## 2025-06-26 RX ORDER — METFORMIN HYDROCHLORIDE 500 MG/1
1000 TABLET, EXTENDED RELEASE ORAL 2 TIMES DAILY
Qty: 360 TABLET | Refills: 1 | Status: SHIPPED | OUTPATIENT
Start: 2025-06-26 | End: 2026-06-26

## 2025-06-26 RX ORDER — NATEGLINIDE 120 MG/1
120 TABLET ORAL 3 TIMES DAILY
Qty: 270 TABLET | Refills: 1 | Status: SHIPPED | OUTPATIENT
Start: 2025-06-26

## 2025-06-26 RX ORDER — ICOSAPENT ETHYL 1000 MG/1
2 CAPSULE ORAL 2 TIMES DAILY WITH MEALS
Qty: 360 CAPSULE | Refills: 1 | Status: SHIPPED | OUTPATIENT
Start: 2025-06-26

## 2025-06-26 RX ORDER — PIOGLITAZONE 30 MG/1
30 TABLET ORAL DAILY
Qty: 90 TABLET | Refills: 1 | Status: SHIPPED | OUTPATIENT
Start: 2025-06-26 | End: 2026-06-26

## 2025-06-26 RX ORDER — LANCETS 28 GAUGE
EACH MISCELLANEOUS
Qty: 300 EACH | Refills: 3 | Status: SHIPPED | OUTPATIENT
Start: 2025-06-26

## 2025-06-26 RX ORDER — ATORVASTATIN CALCIUM 20 MG/1
20 TABLET, FILM COATED ORAL NIGHTLY
Qty: 90 TABLET | Refills: 1 | Status: SHIPPED | OUTPATIENT
Start: 2025-06-26

## 2025-06-26 NOTE — PROGRESS NOTES
"Chief Complaint  Diabetes    Subjective        Emmanuel Roach presents to [facility] ENDOCRINOLOGY  History of Present Illness    Type 2 dm, > 10 years    DM regimen: starlix 120 mg po up to TIDAC, trulicity 3mg weekly, actos 30 mg po daily, metformin 1000mg bid   Tries to check his BS QAM: 130s  Last eye exam: Summer 2024  Known complications: neuropathy   Renal: losartan 50mg QD  Episodes of hypoglycemia: no reports below 90 but does feel symptoms at this time   CV: atorvastatin 20mg QD and vascepa 2g BID      Objective   Vital Signs:  /62   Pulse 81   Temp 98.1 °F (36.7 °C) (Oral)   Ht 182.9 cm (72.01\")   Wt 87.8 kg (193 lb 9.6 oz)   SpO2 97%   BMI 26.25 kg/m²   Estimated body mass index is 26.25 kg/m² as calculated from the following:    Height as of this encounter: 182.9 cm (72.01\").    Weight as of this encounter: 87.8 kg (193 lb 9.6 oz).          Physical Exam  Vitals reviewed.   Constitutional:       General: He is not in acute distress.  HENT:      Head: Normocephalic and atraumatic.   Cardiovascular:      Rate and Rhythm: Normal rate.   Pulmonary:      Effort: Pulmonary effort is normal. No respiratory distress.   Musculoskeletal:         General: No signs of injury. Normal range of motion.      Cervical back: Normal range of motion and neck supple.   Skin:     General: Skin is warm and dry.   Neurological:      Mental Status: He is alert and oriented to person, place, and time. Mental status is at baseline.   Psychiatric:         Mood and Affect: Mood normal.         Behavior: Behavior normal.         Thought Content: Thought content normal.         Judgment: Judgment normal.        Result Review :  The following data was reviewed by: EDVIN Sierra on 06/26/2025:  Common labs          7/24/2024    12:16 12/26/2024    10:50   Common Labs   Glucose  135    BUN  27    Creatinine  1.29    Sodium  141    Potassium  4.6    Chloride  104    Calcium  9.2    Albumin  4.5  " "  Total Bilirubin  0.4    Alkaline Phosphatase  103    AST (SGOT)  16    ALT (SGPT)  27    Hemoglobin A1C 7.6  7.50    Microalbumin, Urine 54.2                 Assessment and Plan   Diagnoses and all orders for this visit:    1. Type 2 diabetes mellitus with hyperglycemia, without long-term current use of insulin (Primary)  -     Lancets (freestyle) lancets; Test 3 times daily  Dispense: 300 each; Refill: 3  -     FREESTYLE LITE test strip; DX CODE E11.9Test 3 times daily  Dispense: 300 each; Refill: 3  -     metFORMIN ER (GLUCOPHAGE-XR) 500 MG 24 hr tablet; Take 2 tablets by mouth 2 (Two) Times a Day.  Dispense: 360 tablet; Refill: 1  -     nateglinide (STARLIX) 120 MG tablet; Take 1 tablet by mouth 3 (Three) Times a Day.  Dispense: 270 tablet; Refill: 1  -     pioglitazone (Actos) 30 MG tablet; Take 1 tablet by mouth Daily.  Dispense: 90 tablet; Refill: 1  -     Lipid Panel  -     Hemoglobin A1c  -     Comprehensive Metabolic Panel  -     Microalbumin / Creatinine Urine Ratio - Urine, Clean Catch    2. Mixed hyperlipidemia  -     atorvastatin (LIPITOR) 20 MG tablet; Take 1 tablet by mouth Every Night.  Dispense: 90 tablet; Refill: 1  -     Vascepa 1 g capsule capsule; Take 2 g by mouth 2 (Two) Times a Day With Meals.  Dispense: 360 capsule; Refill: 1    3. Diabetic peripheral neuropathy associated with type 2 diabetes mellitus    4. Stage 3a chronic kidney disease             Follow Up   Return in about 6 months (around 12/26/2025).    Reviewed medication side effects and dosing  Reports rare emesis \"without rhyme or reason\"- will continue to monitor  Labs today  Reviewed hypoglycemia concerns vs hunger cues when BS is in normal range but feeling hypoglycemia symptoms   Continue statin and arb  Additional recommendations to follow as needed       Patient was given instructions and counseling regarding his condition or for health maintenance advice. Please see specific information pulled into the AVS if " appropriate.         Lola Edwards, APRN

## 2025-06-27 LAB
ALBUMIN SERPL-MCNC: 4.5 G/DL (ref 3.5–5.2)
ALBUMIN/CREAT UR: 6 MG/G CREAT (ref 0–29)
ALBUMIN/GLOB SERPL: 1.6 G/DL
ALP SERPL-CCNC: 97 U/L (ref 39–117)
ALT SERPL-CCNC: 18 U/L (ref 1–41)
AST SERPL-CCNC: 19 U/L (ref 1–40)
BILIRUB SERPL-MCNC: 0.6 MG/DL (ref 0–1.2)
BUN SERPL-MCNC: 38 MG/DL (ref 8–23)
BUN/CREAT SERPL: 24.2 (ref 7–25)
CALCIUM SERPL-MCNC: 9.4 MG/DL (ref 8.6–10.5)
CHLORIDE SERPL-SCNC: 105 MMOL/L (ref 98–107)
CHOLEST SERPL-MCNC: 127 MG/DL (ref 0–200)
CO2 SERPL-SCNC: 22 MMOL/L (ref 22–29)
CREAT SERPL-MCNC: 1.57 MG/DL (ref 0.76–1.27)
CREAT UR-MCNC: 157.1 MG/DL
EGFRCR SERPLBLD CKD-EPI 2021: 45.7 ML/MIN/1.73
GLOBULIN SER CALC-MCNC: 2.8 GM/DL
GLUCOSE SERPL-MCNC: 144 MG/DL (ref 65–99)
HBA1C MFR BLD: 7.6 % (ref 4.8–5.6)
HDLC SERPL-MCNC: 42 MG/DL (ref 40–60)
IMP & REVIEW OF LAB RESULTS: NORMAL
LDLC SERPL CALC-MCNC: 69 MG/DL (ref 0–100)
MICROALBUMIN UR-MCNC: 9.2 UG/ML
POTASSIUM SERPL-SCNC: 4.4 MMOL/L (ref 3.5–5.2)
PROT SERPL-MCNC: 7.3 G/DL (ref 6–8.5)
SODIUM SERPL-SCNC: 140 MMOL/L (ref 136–145)
TRIGL SERPL-MCNC: 79 MG/DL (ref 0–150)
VLDLC SERPL CALC-MCNC: 16 MG/DL (ref 5–40)

## (undated) DEVICE — PENCL E/S ULTRAVAC TELESCP NOSE HOLSTR 10FT

## (undated) DEVICE — Device

## (undated) DEVICE — DRAPE,REIN 53X77,STERILE: Brand: MEDLINE

## (undated) DEVICE — SOL ANTISTICK CAUTRY ELECTROLUBE LF

## (undated) DEVICE — BLADELESS OBTURATOR: Brand: WECK VISTA

## (undated) DEVICE — TRAP FLD MINIVAC MEGADYNE 100ML

## (undated) DEVICE — CATH FOL SIMPLYLATEX SILELAST 18F 17IN

## (undated) DEVICE — SUT MNCRYL 3/0 SH 27 IN Y416H

## (undated) DEVICE — GLV SURG BIOGEL LTX PF 8

## (undated) DEVICE — VESSEL SEALER EXTEND: Brand: ENDOWRIST

## (undated) DEVICE — COLUMN DRAPE

## (undated) DEVICE — LOU LITHOTOMY ROBOTIC: Brand: MEDLINE INDUSTRIES, INC.

## (undated) DEVICE — GLV SURG SENSICARE PI MIC PF SZ8 LF STRL

## (undated) DEVICE — SUT MNCRYL 3/0 SH 27IN DYED Y316H

## (undated) DEVICE — ENDOPOUCH RETRIEVER SPECIMEN RETRIEVAL BAGS: Brand: ENDOPOUCH RETRIEVER

## (undated) DEVICE — JACKSON-PRATT 100CC BULB RESERVOIR: Brand: CARDINAL HEALTH

## (undated) DEVICE — SOL NACL 0.9PCT 1000ML

## (undated) DEVICE — SUT MNCRYL PLS ANTIB UD 4/0 PS2 18IN

## (undated) DEVICE — APPL HEMOS FOR DELIVERY FLOSEAL

## (undated) DEVICE — SYRINGE,TOOMEY,IRRIGATION,70CC,STERILE: Brand: MEDLINE

## (undated) DEVICE — ANTIBACTERIAL UNDYED BRAIDED (POLYGLACTIN 910), SYNTHETIC ABSORBABLE SUTURE: Brand: COATED VICRYL

## (undated) DEVICE — MEDICINE CUP, GRADUATED, STER: Brand: MEDLINE

## (undated) DEVICE — CATHETER,FOLEY,SILI-ELAST,LTX,20FR,10ML: Brand: MEDLINE

## (undated) DEVICE — SUT VIC 0/0 UR6 27IN DYED J603H

## (undated) DEVICE — ENDOPATH XCEL BLADELESS TROCARS WITH STABILITY SLEEVES: Brand: ENDOPATH XCEL

## (undated) DEVICE — CANNULA SEAL

## (undated) DEVICE — ARM DRAPE

## (undated) DEVICE — TIP COVER ACCESSORY

## (undated) DEVICE — ENDOPATH XCEL UNIVERSAL TROCAR STABLILITY SLEEVES: Brand: ENDOPATH XCEL

## (undated) DEVICE — SUT SILK 2/0 FS BLK 18IN 685G

## (undated) DEVICE — CATH FOL COUDE TIEMAN 2WY 18F 5CC